# Patient Record
Sex: FEMALE | Race: BLACK OR AFRICAN AMERICAN | NOT HISPANIC OR LATINO | URBAN - METROPOLITAN AREA
[De-identification: names, ages, dates, MRNs, and addresses within clinical notes are randomized per-mention and may not be internally consistent; named-entity substitution may affect disease eponyms.]

---

## 2020-01-31 ENCOUNTER — TELEPHONE (OUTPATIENT)
Dept: CARDIOLOGY | Facility: CLINIC | Age: 75
End: 2020-01-31

## 2020-01-31 NOTE — TELEPHONE ENCOUNTER
"New Patient Visit Questionnaire  Use the F2 key to seamlessly move through the asterisks.  Reason for appointment? Needs cardiac clearance for Knee replacement with Dr Fortino Lawson    Who referred you: PCP    Any current symptoms (chest pain, shortness of breath, palpitations, swelling, etc.)?   NO  If patient has symptoms, how long have they been present?  N/A    Name of primary care physician  Edis Huang    Has the patient ever been seen by a cardiologist before?  NO               If YES, please obtain name and location of previous cardiologist.    Do you give us permission to obtain Medical records from the Providers listed? YES    What is the best way to send the New Patient Packet? Mail    In order to make the patient's visit as comprehensive as possible, please ask the following questions:    Have you had any recent lab work performed? Last labs June 2019              If yes, where was this performed?  PCP Office    Have you ever had any cardiac testing (echo, stress test, carotid or aortic ultrasounds, cardiac MRI, etc.) no  Which types of testing were performed?   When and where were they performed?  If the patient has copies of these reports, please bring them to the visit.     If the patient is coming for a visit regarding a heart murmur or heart valve problem, he/she will need to obtain a disc of their most recent echocardiogram and bring it to the appointment.     Have you ever had a cardiac catheterization, angioplasty, stent or heart surgery?  no  Which procedures?  When and where were these procedures performed?      Have you had any recent hospitalizations? no  If yes, where were you hospitalized?    For what reason?      PATIENT INSTRUCTIONS   Please bring a list of all your medications with the name of the medication, the milligrams and how frequently you take the medication.  For example: \"Amlodipine 10 mg once a day\".    If you do not have a list, please bring all of your medication " bottles with you.  Please include prescription and non-prescription medicines including supplements and vitamins.  Please arrive 20 to 30 minutes prior to your appointment time to complete any necessary paperwork.    Please bring your photo ID, insurance card and prescription card to the visit.

## 2020-02-03 ENCOUNTER — HOSPITAL ENCOUNTER (OUTPATIENT)
Dept: CARDIOLOGY | Facility: CLINIC | Age: 75
Setting detail: NUCLEAR MEDICINE
Discharge: HOME | End: 2020-02-03
Attending: INTERNAL MEDICINE
Payer: COMMERCIAL

## 2020-02-03 ENCOUNTER — OFFICE VISIT (OUTPATIENT)
Dept: CARDIOLOGY | Facility: CLINIC | Age: 75
End: 2020-02-03
Payer: COMMERCIAL

## 2020-02-03 VITALS
DIASTOLIC BLOOD PRESSURE: 80 MMHG | WEIGHT: 165 LBS | BODY MASS INDEX: 29.23 KG/M2 | HEIGHT: 63 IN | SYSTOLIC BLOOD PRESSURE: 140 MMHG

## 2020-02-03 VITALS
WEIGHT: 165.4 LBS | HEIGHT: 63 IN | BODY MASS INDEX: 29.3 KG/M2 | HEART RATE: 68 BPM | SYSTOLIC BLOOD PRESSURE: 140 MMHG | DIASTOLIC BLOOD PRESSURE: 80 MMHG | RESPIRATION RATE: 16 BRPM

## 2020-02-03 DIAGNOSIS — Z01.810 PREOPERATIVE CARDIOVASCULAR EXAMINATION: ICD-10-CM

## 2020-02-03 DIAGNOSIS — E78.00 ELEVATED CHOLESTEROL: ICD-10-CM

## 2020-02-03 DIAGNOSIS — I10 ESSENTIAL HYPERTENSION: ICD-10-CM

## 2020-02-03 DIAGNOSIS — R94.31 ABNORMAL EKG: Primary | ICD-10-CM

## 2020-02-03 DIAGNOSIS — R06.02 SHORTNESS OF BREATH: ICD-10-CM

## 2020-02-03 DIAGNOSIS — R94.31 ABNORMAL EKG: ICD-10-CM

## 2020-02-03 PROBLEM — R42 DIZZINESS: Status: ACTIVE | Noted: 2017-06-22

## 2020-02-03 PROBLEM — M19.90 DEGENERATIVE JOINT DISEASE: Status: ACTIVE | Noted: 2017-06-22

## 2020-02-03 PROCEDURE — 93306 TTE W/DOPPLER COMPLETE: CPT | Performed by: INTERNAL MEDICINE

## 2020-02-03 PROCEDURE — 99214 OFFICE O/P EST MOD 30 MIN: CPT | Mod: 25 | Performed by: INTERNAL MEDICINE

## 2020-02-03 PROCEDURE — 93000 ELECTROCARDIOGRAM COMPLETE: CPT | Performed by: INTERNAL MEDICINE

## 2020-02-03 RX ORDER — ASPIRIN 81 MG/1
81 TABLET ORAL DAILY
COMMUNITY
End: 2022-02-18

## 2020-02-03 RX ORDER — METOPROLOL SUCCINATE 25 MG/1
25 TABLET, EXTENDED RELEASE ORAL
COMMUNITY
Start: 2019-12-14 | End: 2020-03-12

## 2020-02-03 NOTE — LETTER
February 11, 2020     Edis Huang DO  [address]  [place] [address]    Patient: Bethanie Vela  YOB: 1945  Date of Visit: 2/3/2020      Dear Dr. Huang:    Thank you for referring Bethanie Vela to me for evaluation. Below are my notes for this consultation.    If you have questions, please do not hesitate to call me. I look forward to following your patient along with you.         Sincerely,        Blanca Lake MD        CC: MD Lorenzo Denton Jr., Erin, MD  2/11/2020  6:13 PM  Signed       Cardiology Office Visit     Patient ID: Bethanie Vela 74 y.o. female 1945  PCP: Edis Huang DO   History Present Illness     Bethanie Vela returns to the office today for the first time since June 2017 in order to obtain preoperative clearance.  We saw her at that time for a second opinion regarding the need for catheterization.  Bethanie is a 74-year-old -American female whose past medical history is significant for hypertension, which was previously treated with hydrochlorothiazide.  She became very dehydrated and orthostatic, necessitating a visit to the emergency room with dizziness and palpitations in 2017.  Her EKG was abnormal with some T wave inversion and nonspecific ST-T abnormality and she underwent treadmill stress testing.  At that time she was told that she had a poor exercise capacity, but she says she was scared walking on the treadmill and had knee discomfort.  The cardiologist at that time recommended a catheterization, but she did not feel comfortable with this recommendation.  Her cousin is a cardiologist at [place] and advised her to get a second opinion.    We performed a pharmacologic nuclear stress test on 7/11/2017, which was negative for ischemia.  She had mildly reduced isotope uptake at the apex consistent with breast attenuation.  Pharmacologic EKG was normal.  We decided at that point that it was not  "necessary for her to pursue cardiac catheterization and she was recommended to manage her issues with lifestyle changes and medications.    She now presents to the office today because she is undergoing knee surgery in the next few weeks and required preoperative clearance.  She saw her primary care physician, but was told that the EKG was abnormal and she needed to see cardiology.  Review of her EKG shows that there is no significant change from tracings obtained several years ago.    Past History     Past Medical History:   Diagnosis Date   • Abnormal EKG    • DJD (degenerative joint disease)    • Hypertension    • Lipid disorder    • Volume depletion     Secondary to use of hydrochlorothiazide.     Past Surgical History:   Procedure Laterality Date   • AXILLARY SURGERY      lump removed   • KNEE ARTHROSCOPY Left 2013     Social History:  reports that she has never smoked. She has never used smokeless tobacco. She reports that she drinks alcohol. She reports that she does not use drugs.    Family History: family history includes Diabetes in her biological father; Hyperlipidemia in her biological father; Lung cancer in her biological father; Thyroid disease in her biological mother.  Allergies/Medications   Allergies:Patient has no known allergies.    Medications:  Outpatient Encounter Medications as of 2/3/2020:   •  aspirin 81 mg enteric coated tablet, Take 81 mg by mouth daily.  •  metoprolol succinate XL (TOPROL-XL) 25 mg 24 hr tablet, Take 25 mg by mouth once daily.  ROS/Physical Exam   ROS  Pertinent items are noted in HPI.  Comprehensive (12+ point) ROS otherwise negative.  Vitals:    02/03/20 0918   BP: 140/80   BP Location: Left upper arm   Patient Position: Sitting   Pulse: 68   Resp: 16   Weight: 75 kg (165 lb 6.4 oz)   Height: 1.6 m (5' 3\")     BP Readings from Last 3 Encounters:   02/03/20 140/80     Wt Readings from Last 3 Encounters:   02/03/20 75 kg (165 lb 6.4 oz)     Physical Exam "   Constitutional: She is oriented to person, place, and time. She appears well-developed and well-nourished. She is cooperative.   Appears younger than stated age.   HENT:   Head: Normocephalic and atraumatic.   Mouth/Throat: Oropharynx is clear and moist and mucous membranes are normal.   Eyes: Conjunctivae and EOM are normal. No scleral icterus.   Neck: Normal range of motion. Neck supple. No hepatojugular reflux and no JVD present. Carotid bruit is not present. No thyromegaly present.   Cardiovascular: Normal rate, regular rhythm, S1 normal, S2 normal and intact distal pulses. PMI is not displaced. Exam reveals gallop and S4. Exam reveals no S3 and no friction rub.   Pulses:       Carotid pulses are 2+ on the right side, and 2+ on the left side.       Posterior tibial pulses are 2+ on the right side, and 2+ on the left side.   Pulmonary/Chest: Effort normal and breath sounds normal. She has no wheezes. She has no rhonchi. She has no rales.   Abdominal: Soft. Bowel sounds are normal. She exhibits no abdominal bruit. There is no tenderness.   Musculoskeletal: Normal range of motion. She exhibits no tenderness or deformity.   Neurological: She is alert and oriented to person, place, and time. No cranial nerve deficit.   Skin: Skin is warm, dry and intact. No rash noted. No cyanosis. Nails show no clubbing.   Psychiatric: Her speech is normal and behavior is normal. Her mood appears anxious.   Concerned because she was told that her EKG was abnormal and she needs to see us again before having knee surgery.     Labs/Imaging/Procedures   Labs  No recent lab work available.  Lab 6/19/2019: Total cholesterol 226, HDL 70, , glucose 116, creatinine 1.16, LFTs normal.  Hemoglobin A1c 6.3.  TSH 2.4    Imaging  Echo 2/3/2020: Overall normal LV size and function with LVEF 60-65%.  Mild concentric LVH.  Normal wall motion.  Impaired relaxation. Top normal RV size with preserved function.  Mild TR.  Estimated PASP 25  mmHg. Trileaflet aortic valve.  No aortic stenosis.  Visualized portions of aorta appear sclerotic.  Mildly thickened mitral valve leaflets with mild MR. No significant pericardial effusion.    Pharmacologic MPI 7/11/2017: Probably normal study.  Pharmacologic EKG negative for ischemia or arrhythmia.  Small area of mildly reduced isotope uptake at the apex which is predominantly fixed.  Most likely breast attenuation.  LVEF 84% with probable LVH.    Exercise stress echo at outside institution 5/16/2017: Borderline positive EKG portion.  Echocardiographic images negative for ischemia.  Below average exercise capacity.    EKG  Assessment/Plan     1. Preoperative cardiovascular examination  Bethanie may proceed with upcoming knee surgery and presents acceptable cardiovascular risk.  Although her EKG is abnormal, it is unchanged and she had prior stress testing performed which was negative for ischemia.  She has no new anginal symptoms.  An echocardiogram done in our office shows preserved LV function.  Please allow her to take her Toprol-XL the morning of surgery.  She may hold her aspirin for 5 to 7 days as needed.    2. Abnormal EKG  No significant EKG changes.    3. Elevated cholesterol  Bethanie has a history of some elevated glucose and could benefit from statin therapy.  She is not sure that she wants to pursue this right now and would prefer to try lifestyle changes.  Hopefully the knee surgery will allow her to be more physically active.    4. Essential hypertension  Continue Toprol-XL 25 mg daily.  Low-salt diet.  Regular physical activity.    Return if symptoms worsen or fail to improve.  She will contact us with any change in her symptoms.  I have reviewed the patient's medical history in detail and updated the computerized patient record.  Thank you for allowing me to participate in the care of this patient.  I hope this information is helpful.    Blanca Lake MD [facility]  2/3/2020  10:09 AM  This  document was generated utilizing voice recognition technology. Please excuse any typographical errors which may be present.

## 2020-02-05 LAB
AORTIC ROOT ANNULUS - M-MODE: 2.9 CM
ASCENDING AORTA: 3 CM
AV PEAK GRADIENT: 5 MMHG
AV PEAK VELOCITY-S: 1.11 M/S
AV VALVE AREA: 2.25 CM2
BSA FOR ECHO PROCEDURE: 1.82 M2
CUSP SEPARATION: 1.6 CM
DOP CALC LVOT STROKE VOLUME: 55.83 ML
E WAVE DECELERATION TIME: 197 MS
E/A RATIO: 0.8
E/E' RATIO: 13.9
E/LAT E' RATIO: 14.2
ECHO EF ESTIMATED: 56.8 %
EDV (BP): 32.5 CM3
EF (A4C): 62.8 %
EF A2C: 59.6 %
EJECTION FRACTION: 61.2 %
EST RIGHT VENT SYSTOLIC PRESSURE BY TRICUSPID REGURGITATION JET: 25 MMHG
ESV (BP): 12.6 CM3
FRACTIONAL SHORTENING: 29.1 %
INTERVENTRICULAR SEPTUM: 1.23 CM
LA ESV (BP): 40.3 CM3
LA ESV INDEX (A2C): 25.22 CM3/M2
LA ESV INDEX (BP): 22.14 CM3/M2
LA/AORTA RATIO: 1.28
LAAS-AP2: 17 CM2
LAAS-AP4: 15 CM2
LAD 2D - M-MODE: 3.7 CM
LAD 2D: 3.4 CM
LALD A4C: 5.09 CM
LALD A4C: 5.22 CM
LAV-S: 45.9 CM3
LEFT ATRIUM VOLUME INDEX: 19.07 CM3/M2
LEFT ATRIUM VOLUME: 34.7 CM3
LEFT INTERNAL DIMENSION IN SYSTOLE: 2.61 CM (ref 2.48–3.75)
LEFT VENTRICLE DIASTOLIC VOLUME INDEX: 19.95 CM3/M2
LEFT VENTRICLE DIASTOLIC VOLUME: 36.3 CM3
LEFT VENTRICLE SYSTOLIC VOLUME INDEX: 7.42 CM3/M2
LEFT VENTRICLE SYSTOLIC VOLUME: 13.5 CM3
LEFT VENTRICULAR INTERNAL DIMENSION IN DIASTOLE: 3.68 CM (ref 4.18–5.8)
LEFT VENTRICULAR POSTERIOR WALL IN END DIASTOLE: 1.15 CM (ref 0.55–1.02)
LV DIASTOLIC VOLUME: 29.2 CM3
LV ESV (APICAL 2 CHAMBER): 11.8 CM3
LVAD-AP2: 14.2 CM2
LVAD-AP4: 16 CM2
LVAS-AP2: 7.75 CM2
LVAS-AP4: 8.46 CM2
LVEDVI(A2C): 16.04 CM3/M2
LVEDVI(BP): 17.86 CM3/M2
LVESVI(A2C): 6.48 CM3/M2
LVESVI(BP): 6.92 CM3/M2
LVLD-AP2: 6.07 CM
LVLD-AP4: 6.1 CM
LVLS-AP2: 5.17 CM
LVLS-AP4: 4.99 CM
LVOT 2D: 1.9 CM
LVOT A: 2.84 CM2
LVOT MG: 2 MMHG
LVOT MV: 0.62 M/S
LVOT PEAK VELOCITY: 0.88 M/S
LVOT PG: 3 MMHG
LVOT VTI: 19.7 CM
MV E'TISSUE VEL-LAT: 0.06 M/S
MV E'TISSUE VEL-MED: 0.06 M/S
MV PEAK A VEL: 0.96 M/S
MV PEAK E VEL: 0.8 M/S
POSTERIOR WALL: 1.15 CM
PV PEAK GRADIENT: 2 MMHG
PV PV: 0.77 M/S
RVOT VMAX: 0.56 M/S
RVOT VTI: 14.3 CM
SEPTAL TISSUE DOPPLER FREE WALL LATE DIA VELOCITY (APICAL 4 CHAMBER VIEW): 0.09 M/S
TR MAX PG: 21 MMHG
TRICUSPID VALVE PEAK REGURGITATION VELOCITY: 2.31 M/S
Z-SCORE OF LEFT VENTRICULAR DIMENSION IN END DIASTOLE: -2.92
Z-SCORE OF LEFT VENTRICULAR DIMENSION IN END SYSTOLE: -1.22
Z-SCORE OF LEFT VENTRICULAR POSTERIOR WALL IN END DIASTOLE: 2.25

## 2020-02-07 NOTE — RESULT ENCOUNTER NOTE
Could you please let Mrs. Vela know that her echo looks good-it is very similar to the previous study.  Heart muscle function is normal.  No need for any additional testing for clearance.  Thank you

## 2020-02-21 ENCOUNTER — TELEPHONE (OUTPATIENT)
Dept: SCHEDULING | Facility: CLINIC | Age: 75
End: 2020-02-21

## 2020-02-21 NOTE — TELEPHONE ENCOUNTER
Bethanie called to request her offic enote 2/3 be sent to [facility]   Attn Barbara Crews  Fax#[phone]    Ov note and echo dated 2/3 were sent  Please route EKG tracing

## 2020-03-12 ENCOUNTER — TELEPHONE (OUTPATIENT)
Dept: SCHEDULING | Facility: CLINIC | Age: 75
End: 2020-03-12

## 2020-03-12 RX ORDER — METOPROLOL SUCCINATE 25 MG/1
50 TABLET, EXTENDED RELEASE ORAL
COMMUNITY
Start: 2020-03-12 | End: 2020-09-04

## 2020-03-12 RX ORDER — CHLORTHALIDONE 25 MG/1
25 TABLET ORAL DAILY
Qty: 90 TABLET | Refills: 1 | COMMUNITY
Start: 2020-03-12 | End: 2020-09-04 | Stop reason: SDUPTHER

## 2020-03-12 NOTE — TELEPHONE ENCOUNTER
TESSDE  Bethanie called to give the Dr an update regarding her BP,   after the procedure on 2/28 for her knee,  she went to the hospital because it went high.  She saw her PCP Dr Huang following this hospital visit. The Dr adjusted her meds.   She is taking Chlorithalidone 25 mg in addition to the metoprolol 50 mg. She has been taking this newer dose since Monday 3/2.     3/6-/86   /88  3/9-515pm 151/72  3/11 248pm-143/78  3/12-148pm 142/79

## 2020-03-12 NOTE — TELEPHONE ENCOUNTER
She should continue on these medications-it looks like her blood pressure is improving.  Please have her schedule an office visit with Kathryn if she is not seeing her primary doctor within the next 2 to 4 weeks.

## 2020-03-12 NOTE — TELEPHONE ENCOUNTER
Spoke with Bethanie instructed to stay on current medications adjusted by pcp verbalized full understanding. Also, Bethanie has follow up scheduled with PCP in 2 weeks, I instructed her to call us back with any additional concerns or issues verb. Full understanding. Thank you!

## 2020-09-04 ENCOUNTER — DOCUMENTATION (OUTPATIENT)
Dept: CARDIOLOGY | Facility: CLINIC | Age: 75
End: 2020-09-04

## 2020-09-04 ENCOUNTER — OFFICE VISIT (OUTPATIENT)
Dept: CARDIOLOGY | Facility: CLINIC | Age: 75
End: 2020-09-04
Payer: COMMERCIAL

## 2020-09-04 VITALS
WEIGHT: 170 LBS | OXYGEN SATURATION: 96 % | DIASTOLIC BLOOD PRESSURE: 88 MMHG | HEIGHT: 63 IN | HEART RATE: 78 BPM | SYSTOLIC BLOOD PRESSURE: 140 MMHG | TEMPERATURE: 97.9 F | BODY MASS INDEX: 30.12 KG/M2

## 2020-09-04 DIAGNOSIS — R06.02 SHORTNESS OF BREATH: ICD-10-CM

## 2020-09-04 DIAGNOSIS — I10 ESSENTIAL HYPERTENSION: ICD-10-CM

## 2020-09-04 DIAGNOSIS — E78.00 ELEVATED CHOLESTEROL: ICD-10-CM

## 2020-09-04 DIAGNOSIS — R94.31 ABNORMAL EKG: Primary | ICD-10-CM

## 2020-09-04 PROCEDURE — 99214 OFFICE O/P EST MOD 30 MIN: CPT | Performed by: INTERNAL MEDICINE

## 2020-09-04 RX ORDER — NEBIVOLOL 5 MG/1
5 TABLET ORAL DAILY
Qty: 28 TABLET | Refills: 0 | COMMUNITY
Start: 2020-09-04 | End: 2021-12-14 | Stop reason: SDUPTHER

## 2020-09-04 RX ORDER — CHLORTHALIDONE 25 MG/1
25 TABLET ORAL DAILY
Qty: 90 TABLET | Refills: 1 | Status: SHIPPED | OUTPATIENT
Start: 2020-09-04 | End: 2021-05-21

## 2020-09-04 NOTE — LETTER
"September 4, 2020     Edis Huang DO  [address]  [place] [address]    Patient: Bethanie Vela  YOB: 1945  Date of Visit: 9/4/2020      Dear Dr. Huang:    Thank you for referring Bethanie Vela to me for evaluation. Below are my notes for this consultation.    If you have questions, please do not hesitate to call me. I look forward to following your patient along with you.         Sincerely,        Blanca Lake MD        CC: No Recipients  Blanca Silva MD  9/4/2020  2:33 PM  Signed       Cardiology Office Visit     Patient ID: Bethanie Vela 74 y.o. female 1945  PCP: Edis Huang DO   History Present Illness     Bethanie Vela returns to the office today regarding an abnormal EKG and increasing shortness of breath.  Bethanie is a 74-year-old -American female whose past medical history is significant for hypertension and an abnormal EKG.  Please recall that several years ago she was told that she needed to have a cardiac catheterization after walking poorly on the treadmill.  The patient says that she was very scared walking with knee pain and thought that she could fall.  She had a pharmacologic stress test in July 2017 which was negative for ischemia.  She does have mild LVH on echocardiogram, consistent with her baseline EKG.    She was seen in February of this year in preparation of undergoing knee surgery and required preoperative clearance.  We accomplish this and she underwent surgery but says that her blood pressure was \"very high afterward\".  It did not really decrease over the following several days and she went to the emergency room, but was discharged.  Her Toprol-XL was increased from 25 to 50 mg daily and she remains on chlorthalidone.    Today she says that she has been feeling increasingly anxious and depressed for a multitude of reasons but her shortness of breath has worsened.  She feels short of breath when she goes " "upstairs, but usually not at rest.  She has no chest pain.  Chest x-ray was performed just a few days ago which we reviewed and it is unremarkable.  There is aortic ectasia but no infiltrates or pulmonary findings.  Her EKG is abnormal but unchanged with nonspecific ST-T abnormalities more prominent laterally.  We did not perform another tracing today.    Past History   History review: Pertinent allergies, medications, medical history, surgical history, social history and family history reviewed and updated appropriately.    Allergies/Medications   Allergies:Patient has no known allergies.    Medications:  Outpatient Encounter Medications as of 9/4/2020:   •  aspirin 81 mg enteric coated tablet, Take 81 mg by mouth daily.  •  chlorthalidone (HYGROTEN) 25 mg tablet, Take 1 tablet (25 mg total) by mouth daily.  •  [DISCONTINUED] metoprolol succinate XL (TOPROL-XL) 25 mg 24 hr tablet, Take 2 tablets (50 mg total) by mouth once daily.  •  nebivoloL (BYSTOLIC) 5 mg tablet, Take 1 tablet (5 mg total) by mouth daily.  ROS/Physical Exam   ROS  Pertinent items are noted in HPI.  Comprehensive (12+ point) ROS otherwise negative.  Vitals:    09/04/20 1143 09/04/20 1212   BP: 132/84 140/88   BP Location: Right upper arm Left upper arm   Patient Position: Sitting    Pulse: 78 78   Temp: 36.6 °C (97.9 °F)    SpO2: 96%    Weight: 77.1 kg (170 lb)    Height: 1.6 m (5' 3\")      BP Readings from Last 3 Encounters:   09/04/20 140/88   02/03/20 140/80   02/03/20 140/80     Wt Readings from Last 3 Encounters:   09/04/20 77.1 kg (170 lb)   02/03/20 74.8 kg (165 lb)   02/03/20 75 kg (165 lb 6.4 oz)     Physical Exam   Constitutional: She is oriented to person, place, and time. She appears well-developed and well-nourished. She is cooperative.   Appears younger than stated age.   HENT:   Head: Normocephalic and atraumatic.   Mouth/Throat: Oropharynx is clear and moist and mucous membranes are normal.   Eyes: Conjunctivae and EOM are " normal. No scleral icterus.   Neck: Normal range of motion. Neck supple. No hepatojugular reflux and no JVD present. Carotid bruit is not present. No thyromegaly present.   Cardiovascular: Normal rate, regular rhythm, S1 normal, S2 normal and intact distal pulses.  No extrasystoles are present. PMI is not displaced. Exam reveals gallop and S4. Exam reveals no S3 and no friction rub.   Murmur heard.  Pulses:       Carotid pulses are 2+ on the right side, and 2+ on the left side.       Posterior tibial pulses are 2+ on the right side, and 2+ on the left side.   There is a 1/6 systolic murmur at the right and left sternal borders   Pulmonary/Chest: Effort normal and breath sounds normal. She has no wheezes. She has no rhonchi. She has no rales.   Abdominal: Soft. Bowel sounds are normal. She exhibits no abdominal bruit. There is no tenderness.   Musculoskeletal: Normal range of motion. She exhibits no tenderness or deformity.   Neurological: She is alert and oriented to person, place, and time. No cranial nerve deficit.   Skin: Skin is warm, dry and intact. No rash noted. No cyanosis. Nails show no clubbing.   Psychiatric: Her speech is normal and behavior is normal. Her mood appears anxious. She exhibits a depressed mood.     Labs/Imaging/Procedures   Labs  Lab 6/19/2019: Total cholesterol 226, HDL 70, , glucose 116, creatinine 1.16, LFTs normal.  Hemoglobin A1c 6.3.  TSH 2.4     Imaging  Echo 2/3/2020: Overall normal LV size and function with LVEF 60-65%.  Mild concentric LVH. Normal wall motion.  Impaired relaxation. Top normal RV size with preserved function.  Mild TR. Estimated PASP 25 mmHg. Trileaflet aortic valve.  No aortic stenosis.  Visualized portions of aorta appear sclerotic. Mildly thickened mitral valve leaflets with mild MR. No significant pericardial effusion.     Pharm MPI 7/11/2017: Probably normal study.  Pharmacologic EKG negative for ischemia or arrhythmia.  Small area of mildly reduced  isotope uptake at the apex which is predominantly fixed.  Most likely breast attenuation.  LVEF 84% with probable LVH.     Exercise stress echo at outside institution 5/16/2017: Borderline positive EKG portion. Echocardiographic images negative for ischemia.  Below average exercise capacity.    EKG  Assessment/Plan     1. Abnormal EKG  Mrs. Vela has an abnormal EKG performed at outside offices, but it does not look any different than the one she had done in February 2020.  I think the EKG abnormalities are secondary to LVH and hypertensive heart disease.  Negative stress testing in 2017.    2. Elevated cholesterol  No recent lab work available although she states she did just have labs done at primary care.    3. Shortness of breath  Her shortness of breath is concerning.  It may be an anginal equivalent but I suspect it is related to suboptimal blood pressure control.  She has no evidence of volume overload.  Continue chlorthalidone 25 mg daily.  Change Toprol-XL to Bystolic 5 mg daily.  She was provided with samples of the medication.  I will talk to her in a week or so and see how she is feeling on the new medication.  If it is too expensive for her, I would change her to carvedilol.  We also discussed the fact that she will need repeat pharmacologic stress testing or possibly cardiac catheterization if her symptoms are persistent and unchanged with better blood pressure control.  She has gained some weight and is going to try to get back to a more prudent style of eating.    4. Essential hypertension  Suboptimal blood pressure control.  Change Toprol to Bystolic 5 mg daily and titrate appropriately.  Continue current dose of chlorthalidone.  Reinforced the importance of a low-salt diet.    Return in about 6 weeks (around 10/16/2020) for follow-up, earlier if any change in symptoms.  I have reviewed the patient's medical history in detail and updated the computerized patient record.  Thank you for allowing me  to participate in the care of this patient.  I hope this information is helpful.  Social distancing and careful monitoring for any COVID symptoms discussed with patient.      Blanca Lake MD [facility], [facility]  9/4/2020  2:26 PM  This document was generated utilizing voice recognition technology. Please excuse any typographical errors which may be present.

## 2020-09-04 NOTE — PROGRESS NOTES
"     Cardiology Office Visit     Patient ID: Bethanie Vela 74 y.o. female 1945  PCP: Edis Huang, DO   History Present Illness     Bethanie Vela returns to the office today regarding an abnormal EKG and increasing shortness of breath.  Bethanie is a 74-year-old -American female whose past medical history is significant for hypertension and an abnormal EKG.  Please recall that several years ago she was told that she needed to have a cardiac catheterization after walking poorly on the treadmill.  The patient says that she was very scared walking with knee pain and thought that she could fall.  She had a pharmacologic stress test in July 2017 which was negative for ischemia.  She does have mild LVH on echocardiogram, consistent with her baseline EKG.    She was seen in February of this year in preparation of undergoing knee surgery and required preoperative clearance.  We accomplish this and she underwent surgery but says that her blood pressure was \"very high afterward\".  It did not really decrease over the following several days and she went to the emergency room, but was discharged.  Her Toprol-XL was increased from 25 to 50 mg daily and she remains on chlorthalidone.    Today she says that she has been feeling increasingly anxious and depressed for a multitude of reasons but her shortness of breath has worsened.  She feels short of breath when she goes upstairs, but usually not at rest.  She has no chest pain.  Chest x-ray was performed just a few days ago which we reviewed and it is unremarkable.  There is aortic ectasia but no infiltrates or pulmonary findings.  Her EKG is abnormal but unchanged with nonspecific ST-T abnormalities more prominent laterally.  We did not perform another tracing today.    Past History   History review: Pertinent allergies, medications, medical history, surgical history, social history and family history reviewed and updated " "appropriately.    Allergies/Medications   Allergies:Patient has no known allergies.    Medications:  Outpatient Encounter Medications as of 9/4/2020:   •  aspirin 81 mg enteric coated tablet, Take 81 mg by mouth daily.  •  chlorthalidone (HYGROTEN) 25 mg tablet, Take 1 tablet (25 mg total) by mouth daily.  •  [DISCONTINUED] metoprolol succinate XL (TOPROL-XL) 25 mg 24 hr tablet, Take 2 tablets (50 mg total) by mouth once daily.  •  nebivoloL (BYSTOLIC) 5 mg tablet, Take 1 tablet (5 mg total) by mouth daily.  ROS/Physical Exam   ROS  Pertinent items are noted in HPI.  Comprehensive (12+ point) ROS otherwise negative.  Vitals:    09/04/20 1143 09/04/20 1212   BP: 132/84 140/88   BP Location: Right upper arm Left upper arm   Patient Position: Sitting    Pulse: 78 78   Temp: 36.6 °C (97.9 °F)    SpO2: 96%    Weight: 77.1 kg (170 lb)    Height: 1.6 m (5' 3\")      BP Readings from Last 3 Encounters:   09/04/20 140/88   02/03/20 140/80   02/03/20 140/80     Wt Readings from Last 3 Encounters:   09/04/20 77.1 kg (170 lb)   02/03/20 74.8 kg (165 lb)   02/03/20 75 kg (165 lb 6.4 oz)     Physical Exam   Constitutional: She is oriented to person, place, and time. She appears well-developed and well-nourished. She is cooperative.   Appears younger than stated age.   HENT:   Head: Normocephalic and atraumatic.   Mouth/Throat: Oropharynx is clear and moist and mucous membranes are normal.   Eyes: Conjunctivae and EOM are normal. No scleral icterus.   Neck: Normal range of motion. Neck supple. No hepatojugular reflux and no JVD present. Carotid bruit is not present. No thyromegaly present.   Cardiovascular: Normal rate, regular rhythm, S1 normal, S2 normal and intact distal pulses.  No extrasystoles are present. PMI is not displaced. Exam reveals gallop and S4. Exam reveals no S3 and no friction rub.   Murmur heard.  Pulses:       Carotid pulses are 2+ on the right side, and 2+ on the left side.       Posterior tibial pulses are 2+ " on the right side, and 2+ on the left side.   There is a 1/6 systolic murmur at the right and left sternal borders   Pulmonary/Chest: Effort normal and breath sounds normal. She has no wheezes. She has no rhonchi. She has no rales.   Abdominal: Soft. Bowel sounds are normal. She exhibits no abdominal bruit. There is no tenderness.   Musculoskeletal: Normal range of motion. She exhibits no tenderness or deformity.   Neurological: She is alert and oriented to person, place, and time. No cranial nerve deficit.   Skin: Skin is warm, dry and intact. No rash noted. No cyanosis. Nails show no clubbing.   Psychiatric: Her speech is normal and behavior is normal. Her mood appears anxious. She exhibits a depressed mood.     Labs/Imaging/Procedures   Labs  Lab 6/19/2019: Total cholesterol 226, HDL 70, , glucose 116, creatinine 1.16, LFTs normal.  Hemoglobin A1c 6.3.  TSH 2.4     Imaging  Echo 2/3/2020: Overall normal LV size and function with LVEF 60-65%.  Mild concentric LVH. Normal wall motion.  Impaired relaxation. Top normal RV size with preserved function.  Mild TR. Estimated PASP 25 mmHg. Trileaflet aortic valve.  No aortic stenosis.  Visualized portions of aorta appear sclerotic. Mildly thickened mitral valve leaflets with mild MR. No significant pericardial effusion.     Pharm MPI 7/11/2017: Probably normal study.  Pharmacologic EKG negative for ischemia or arrhythmia.  Small area of mildly reduced isotope uptake at the apex which is predominantly fixed.  Most likely breast attenuation.  LVEF 84% with probable LVH.     Exercise stress echo at outside institution 5/16/2017: Borderline positive EKG portion. Echocardiographic images negative for ischemia.  Below average exercise capacity.    EKG  Assessment/Plan     1. Abnormal EKG  Mrs. Vela has an abnormal EKG performed at outside offices, but it does not look any different than the one she had done in February 2020.  I think the EKG abnormalities are secondary  to LVH and hypertensive heart disease.  Negative stress testing in 2017.    2. Elevated cholesterol  No recent lab work available although she states she did just have labs done at primary care.    3. Shortness of breath  Her shortness of breath is concerning.  It may be an anginal equivalent but I suspect it is related to suboptimal blood pressure control.  She has no evidence of volume overload.  Continue chlorthalidone 25 mg daily.  Change Toprol-XL to Bystolic 5 mg daily.  She was provided with samples of the medication.  I will talk to her in a week or so and see how she is feeling on the new medication.  If it is too expensive for her, I would change her to carvedilol.  We also discussed the fact that she will need repeat pharmacologic stress testing or possibly cardiac catheterization if her symptoms are persistent and unchanged with better blood pressure control.  She has gained some weight and is going to try to get back to a more prudent style of eating.    4. Essential hypertension  Suboptimal blood pressure control.  Change Toprol to Bystolic 5 mg daily and titrate appropriately.  Continue current dose of chlorthalidone.  Reinforced the importance of a low-salt diet.    Return in about 6 weeks (around 10/16/2020) for follow-up, earlier if any change in symptoms.  I have reviewed the patient's medical history in detail and updated the computerized patient record.  Thank you for allowing me to participate in the care of this patient.  I hope this information is helpful.  Social distancing and careful monitoring for any COVID symptoms discussed with patient.      Blanca Lake MD [facility], [facility]  9/4/2020  2:26 PM  This document was generated utilizing voice recognition technology. Please excuse any typographical errors which may be present.

## 2020-10-09 ENCOUNTER — OFFICE VISIT (OUTPATIENT)
Dept: CARDIOLOGY | Facility: CLINIC | Age: 75
End: 2020-10-09
Payer: COMMERCIAL

## 2020-10-09 ENCOUNTER — DOCUMENTATION (OUTPATIENT)
Dept: CARDIOLOGY | Facility: CLINIC | Age: 75
End: 2020-10-09

## 2020-10-09 VITALS
BODY MASS INDEX: 29.23 KG/M2 | DIASTOLIC BLOOD PRESSURE: 78 MMHG | SYSTOLIC BLOOD PRESSURE: 120 MMHG | OXYGEN SATURATION: 99 % | HEIGHT: 63 IN | WEIGHT: 165 LBS | HEART RATE: 71 BPM

## 2020-10-09 DIAGNOSIS — E78.00 ELEVATED CHOLESTEROL: ICD-10-CM

## 2020-10-09 DIAGNOSIS — I10 ESSENTIAL HYPERTENSION: ICD-10-CM

## 2020-10-09 DIAGNOSIS — R94.31 ABNORMAL EKG: Primary | ICD-10-CM

## 2020-10-09 PROCEDURE — 99214 OFFICE O/P EST MOD 30 MIN: CPT | Performed by: INTERNAL MEDICINE

## 2020-10-09 NOTE — LETTER
October 12, 2020     Edis Huang DO  [address] [place] [id number]    Patient: Bethanie Vela  YOB: 1945  Date of Visit: 10/9/2020      Dear Dr. Huang:    Thank you for referring Bethanie Vela to me for evaluation. Below are my notes for this consultation.    If you have questions, please do not hesitate to call me. I look forward to following your patient along with you.         Sincerely,        Blanca Lake MD        CC: No Recipients  Blanca Silva MD  10/12/2020  1:28 PM  Signed       Cardiology Office Visit     Patient ID: Bethanie Vela 74 y.o. female 1945  PCP: Edis Huang DO   History Present Illness     Bethanie Vela returns to the office today for follow-up of her hypertension, elevated cholesterol and abnormal EKG.  At the time of her last visit, she felt quite unwell and had some increasing shortness of breath and felt generally more anxious.  She had no chest pain.  Her abnormal EKG was unchanged and I thought that the most likely etiology for her increasing dyspnea was elevated blood pressure as well as some weight gain.    We adjusted her medication, changing her Toprol-XL to Bystolic 5 mg daily.  I provided her with samples of this medication and she says she feels much better today in the office.  She is also been focusing on weight loss and is already down 5 pounds.  Mrs. Vela has no chest pain, PND or orthopnea.  She states that she had blood work done in your office, although I do not have these results available today.    Past History   History review: Pertinent allergies, medications, medical history, surgical history, social history and family history reviewed and updated appropriately.    Allergies/Medications   Allergies:Patient has no known allergies.    Medications:  Outpatient Encounter Medications as of 10/9/2020:   •  aspirin 81 mg enteric coated tablet, Take 81 mg by mouth daily.  •  chlorthalidone (HYGROTEN) 25  "mg tablet, Take 1 tablet (25 mg total) by mouth daily.  •  nebivoloL (BYSTOLIC) 5 mg tablet, Take 1 tablet (5 mg total) by mouth daily.  ROS/Physical Exam   ROS  Pertinent items are noted in HPI.  Comprehensive (12+ point) ROS otherwise negative.  Vitals:    10/09/20 1056   BP: 120/78   BP Location: Left upper arm   Patient Position: Sitting   Pulse: 71   SpO2: 99%   Weight: 74.8 kg (165 lb)   Height: 1.594 m (5' 2.75\")     BP Readings from Last 3 Encounters:   10/09/20 120/78   09/04/20 140/88   02/03/20 140/80     Wt Readings from Last 3 Encounters:   10/09/20 74.8 kg (165 lb)   09/04/20 77.1 kg (170 lb)   02/03/20 74.8 kg (165 lb)     Physical Exam   Constitutional: She is oriented to person, place, and time. She appears well-developed and well-nourished. She is cooperative.   Appears younger than stated age.   HENT:   Head: Normocephalic and atraumatic.   Mouth/Throat: Oropharynx is clear and moist and mucous membranes are normal.   Eyes: Conjunctivae and EOM are normal. No scleral icterus.   Neck: Normal range of motion. Neck supple. No hepatojugular reflux and no JVD present. Carotid bruit is not present. No thyromegaly present.   Cardiovascular: Normal rate, regular rhythm, S1 normal, S2 normal and intact distal pulses.  No extrasystoles are present. PMI is not displaced. Exam reveals gallop and S4. Exam reveals no S3 and no friction rub.   Murmur heard.  Pulses:       Carotid pulses are 2+ on the right side and 2+ on the left side.       Posterior tibial pulses are 2+ on the right side and 2+ on the left side.   There is a 1/6 systolic murmur at the right and left sternal borders   Pulmonary/Chest: Effort normal and breath sounds normal. She has no wheezes. She has no rhonchi. She has no rales.   Abdominal: Soft. Bowel sounds are normal. She exhibits no abdominal bruit. There is no abdominal tenderness.   Musculoskeletal: Normal range of motion.         General: No tenderness or deformity.   Neurological: " She is alert and oriented to person, place, and time. No cranial nerve deficit.   Skin: Skin is warm, dry and intact. No rash noted. No cyanosis. Nails show no clubbing.   Psychiatric: Her speech is normal and behavior is normal. Her mood appears anxious. She exhibits a depressed mood.     Labs/Imaging/Procedures   Labs  Lab 6/19/2019: Total cholesterol 226, HDL 70, , glucose 116, creatinine 1.16, LFTs normal.  Hemoglobin A1c 6.3.  TSH 2.4     Imaging  Echo 2/3/2020: Overall normal LV size and function with LVEF 60-65%.  Mild concentric LVH. Normal wall motion.  Impaired relaxation. Top normal RV size with preserved function.  Mild TR. Estimated PASP 25 mmHg. Trileaflet aortic valve.  No aortic stenosis.  Visualized portions of aorta appear sclerotic. Mildly thickened mitral valve leaflets with mild MR. No significant pericardial effusion.     Pharm MPI 7/11/2017: Probably normal study.  Pharmacologic EKG negative for ischemia or arrhythmia.  Small area of mildly reduced isotope uptake at the apex which is predominantly fixed.  Most likely breast attenuation.  LVEF 84% with probable LVH.     Exercise stress echo at outside institution 5/16/2017: Borderline positive EKG portion. Echocardiographic images negative for ischemia.  Below average exercise capacity.     EKG was not performed today  Assessment/Plan     1. Abnormal EKG  EKG is abnormal but unchanged.  She had a pharmacologic stress test in July 2017 which did not show any ischemia.  Her echocardiogram shows mild LVH with preserved LV function.  We will continue with her current medical therapy.    2. Essential hypertension  Patient feels much better after her blood pressure medications were adjusted.  Blood pressure is well controlled on chlorthalidone 25 mg daily and nebivolol 5 mg daily.  I congratulated her on her weight loss efforts and she will continue with this over the next few months.  Mrs. Vela seems very motivated with lifestyle  changes.    3. Elevated cholesterol  She showed me her lab work on the telephone and her cholesterol is elevated, including her LDL.  We discussed options for treating this, specifically statin therapy, which she declines to use and has declined on previous visits.  She states that she would like to continue with diet and exercise and I think this is reasonable to reassess in 6 months.  She is not diabetic and has not had any documented atherosclerosis or cardiac events.    Return in about 6 months (around 4/9/2021) for follow-up, earlier if any change in symptoms.  I have reviewed the patient's medical history in detail and updated the computerized patient record.  Thank you for allowing me to participate in the care of this patient.  I hope this information is helpful.  Social distancing and careful monitoring for any COVID symptoms discussed with patient.      Blanca Lake MD [facility], [facility]  10/12/2020  1:23 PM  This document was generated utilizing voice recognition technology. Please excuse any typographical errors which may be present.

## 2020-10-12 NOTE — PROGRESS NOTES
"     Cardiology Office Visit     Patient ID: Bethanie Vela 74 y.o. female 1945  PCP: Edis Huang, DO   History Present Illness     Bethanie Vela returns to the office today for follow-up of her hypertension, elevated cholesterol and abnormal EKG.  At the time of her last visit, she felt quite unwell and had some increasing shortness of breath and felt generally more anxious.  She had no chest pain.  Her abnormal EKG was unchanged and I thought that the most likely etiology for her increasing dyspnea was elevated blood pressure as well as some weight gain.    We adjusted her medication, changing her Toprol-XL to Bystolic 5 mg daily.  I provided her with samples of this medication and she says she feels much better today in the office.  She is also been focusing on weight loss and is already down 5 pounds.  Mrs. Vela has no chest pain, PND or orthopnea.  She states that she had blood work done in your office, although I do not have these results available today.    Past History   History review: Pertinent allergies, medications, medical history, surgical history, social history and family history reviewed and updated appropriately.    Allergies/Medications   Allergies:Patient has no known allergies.    Medications:  Outpatient Encounter Medications as of 10/9/2020:   •  aspirin 81 mg enteric coated tablet, Take 81 mg by mouth daily.  •  chlorthalidone (HYGROTEN) 25 mg tablet, Take 1 tablet (25 mg total) by mouth daily.  •  nebivoloL (BYSTOLIC) 5 mg tablet, Take 1 tablet (5 mg total) by mouth daily.  ROS/Physical Exam   ROS  Pertinent items are noted in HPI.  Comprehensive (12+ point) ROS otherwise negative.  Vitals:    10/09/20 1056   BP: 120/78   BP Location: Left upper arm   Patient Position: Sitting   Pulse: 71   SpO2: 99%   Weight: 74.8 kg (165 lb)   Height: 1.594 m (5' 2.75\")     BP Readings from Last 3 Encounters:   10/09/20 120/78   09/04/20 140/88   02/03/20 140/80     Wt Readings from " Last 3 Encounters:   10/09/20 74.8 kg (165 lb)   09/04/20 77.1 kg (170 lb)   02/03/20 74.8 kg (165 lb)     Physical Exam   Constitutional: She is oriented to person, place, and time. She appears well-developed and well-nourished. She is cooperative.   Appears younger than stated age.   HENT:   Head: Normocephalic and atraumatic.   Mouth/Throat: Oropharynx is clear and moist and mucous membranes are normal.   Eyes: Conjunctivae and EOM are normal. No scleral icterus.   Neck: Normal range of motion. Neck supple. No hepatojugular reflux and no JVD present. Carotid bruit is not present. No thyromegaly present.   Cardiovascular: Normal rate, regular rhythm, S1 normal, S2 normal and intact distal pulses.  No extrasystoles are present. PMI is not displaced. Exam reveals gallop and S4. Exam reveals no S3 and no friction rub.   Murmur heard.  Pulses:       Carotid pulses are 2+ on the right side and 2+ on the left side.       Posterior tibial pulses are 2+ on the right side and 2+ on the left side.   There is a 1/6 systolic murmur at the right and left sternal borders   Pulmonary/Chest: Effort normal and breath sounds normal. She has no wheezes. She has no rhonchi. She has no rales.   Abdominal: Soft. Bowel sounds are normal. She exhibits no abdominal bruit. There is no abdominal tenderness.   Musculoskeletal: Normal range of motion.         General: No tenderness or deformity.   Neurological: She is alert and oriented to person, place, and time. No cranial nerve deficit.   Skin: Skin is warm, dry and intact. No rash noted. No cyanosis. Nails show no clubbing.   Psychiatric: Her speech is normal and behavior is normal. Her mood appears anxious. She exhibits a depressed mood.     Labs/Imaging/Procedures   Labs  Lab 6/19/2019: Total cholesterol 226, HDL 70, , glucose 116, creatinine 1.16, LFTs normal.  Hemoglobin A1c 6.3.  TSH 2.4     Imaging  Echo 2/3/2020: Overall normal LV size and function with LVEF 60-65%.  Mild  concentric LVH. Normal wall motion.  Impaired relaxation. Top normal RV size with preserved function.  Mild TR. Estimated PASP 25 mmHg. Trileaflet aortic valve.  No aortic stenosis.  Visualized portions of aorta appear sclerotic. Mildly thickened mitral valve leaflets with mild MR. No significant pericardial effusion.     Pharm MPI 7/11/2017: Probably normal study.  Pharmacologic EKG negative for ischemia or arrhythmia.  Small area of mildly reduced isotope uptake at the apex which is predominantly fixed.  Most likely breast attenuation.  LVEF 84% with probable LVH.     Exercise stress echo at outside institution 5/16/2017: Borderline positive EKG portion. Echocardiographic images negative for ischemia.  Below average exercise capacity.     EKG was not performed today  Assessment/Plan     1. Abnormal EKG  EKG is abnormal but unchanged.  She had a pharmacologic stress test in July 2017 which did not show any ischemia.  Her echocardiogram shows mild LVH with preserved LV function.  We will continue with her current medical therapy.    2. Essential hypertension  Patient feels much better after her blood pressure medications were adjusted.  Blood pressure is well controlled on chlorthalidone 25 mg daily and nebivolol 5 mg daily.  I congratulated her on her weight loss efforts and she will continue with this over the next few months.  Mrs. Vela seems very motivated with lifestyle changes.    3. Elevated cholesterol  She showed me her lab work on the telephone and her cholesterol is elevated, including her LDL.  We discussed options for treating this, specifically statin therapy, which she declines to use and has declined on previous visits.  She states that she would like to continue with diet and exercise and I think this is reasonable to reassess in 6 months.  She is not diabetic and has not had any documented atherosclerosis or cardiac events.    Return in about 6 months (around 4/9/2021) for follow-up, earlier if  any change in symptoms.  I have reviewed the patient's medical history in detail and updated the computerized patient record.  Thank you for allowing me to participate in the care of this patient.  I hope this information is helpful.  Social distancing and careful monitoring for any COVID symptoms discussed with patient.      Blanca Lake MD [facility], [facility]  10/12/2020  1:23 PM  This document was generated utilizing voice recognition technology. Please excuse any typographical errors which may be present.

## 2020-11-16 ENCOUNTER — TELEPHONE (OUTPATIENT)
Dept: SCHEDULING | Facility: CLINIC | Age: 75
End: 2020-11-16

## 2020-11-16 NOTE — TELEPHONE ENCOUNTER
Bystolic 5 mg   [id number]  Exp: 03/22  4 bottles  Patient will come to our [facility] office to  Bystolic Samples.

## 2020-11-16 NOTE — TELEPHONE ENCOUNTER
Pt called to get call back for Samples of Bystolic 5mg .   Pt will  in  [facility] office if avail.   Please Call Pt   P#[phone]

## 2021-04-08 NOTE — RESULT ENCOUNTER NOTE
I will review the test results with the patient at upcoming appointment on 4/9/2021.  Need to address markedly elevated LDL..

## 2021-04-09 ENCOUNTER — OFFICE VISIT (OUTPATIENT)
Dept: CARDIOLOGY | Facility: CLINIC | Age: 76
End: 2021-04-09
Payer: COMMERCIAL

## 2021-04-09 VITALS
SYSTOLIC BLOOD PRESSURE: 130 MMHG | BODY MASS INDEX: 30.26 KG/M2 | WEIGHT: 170.8 LBS | OXYGEN SATURATION: 97 % | HEART RATE: 69 BPM | HEIGHT: 63 IN | DIASTOLIC BLOOD PRESSURE: 84 MMHG

## 2021-04-09 DIAGNOSIS — R94.31 ABNORMAL EKG: ICD-10-CM

## 2021-04-09 DIAGNOSIS — E78.00 ELEVATED CHOLESTEROL: ICD-10-CM

## 2021-04-09 DIAGNOSIS — R06.02 SHORTNESS OF BREATH: ICD-10-CM

## 2021-04-09 DIAGNOSIS — I10 ESSENTIAL HYPERTENSION: Primary | ICD-10-CM

## 2021-04-09 PROCEDURE — 93000 ELECTROCARDIOGRAM COMPLETE: CPT | Performed by: INTERNAL MEDICINE

## 2021-04-09 PROCEDURE — 99214 OFFICE O/P EST MOD 30 MIN: CPT | Performed by: INTERNAL MEDICINE

## 2021-04-09 PROCEDURE — 3008F BODY MASS INDEX DOCD: CPT | Performed by: INTERNAL MEDICINE

## 2021-04-09 RX ORDER — CICLOPIROX 8 %
KIT TOPICAL
COMMUNITY
End: 2021-08-13

## 2021-04-09 NOTE — LETTER
"April 9, 2021     Edis Huang DO  [address]  [place] [address]    Patient: Bethanie Vela  YOB: 1945  Date of Visit: 4/9/2021      Dear Dr. Huang:    Thank you for referring Bethanie Vela to me for evaluation. Below are my notes for this consultation.    If you have questions, please do not hesitate to call me. I look forward to following your patient along with you.         Sincerely,        Blanca Lake MD        CC: No Recipients  Blanca Silva MD  4/9/2021  2:03 PM  Signed       Cardiology Office Visit     Patient ID: Bethanie Vela 75 y.o. female 1945  PCP: Edis Huang DO   History Present Illness     Bethanie Vela returns to the office today for follow-up of her hypertension, abnormal EKG, elevated cholesterol and anxiety.  She says that she thinks her anxiety is worse than it was previously and she is also having difficulty sleeping.  She has developed a habit of \"scratching at her scalp\" and is also complaining of some hair loss.  She is not sure if this is due to medication or another issue.  She has an appointment to see a dermatologist.  She had several weeks where she was experiencing frequent epistaxis and was trying to get an appointment with ENT, but the office did not call back.  She says the epistaxis has stopped.  Bethanie has no chest pain.  She admits to dyspnea on exertion if she goes down the stairs to the basement and then comes up again quickly.  She has a 6 bedroom house and is able to maintain this on her own.    We reviewed her recent blood work and her cholesterol has increased significantly with an LDL going from 133 mg/dL to 197 mg/dL in about a year and a half!  In reviewing this with her and discussing her diet, she is eating a very high fat diet.  This was discussed with her at your office visit but she does not want to start medication right now and thinks that she can make significant lifestyle changes.  She " "also thinks that her anxiety is contributing to her eating poorly.    EKG is abnormal but unchanged.  Echocardiogram approximately 1 year ago showed preserved LV function with mild LVH.    Allergies/Medications   Allergies:Patient has no known allergies.    Medications:  Current Outpatient Medications   Medication Instructions   • aspirin 81 mg, oral, Daily   • chlorthalidone (HYGROTEN) 25 mg, oral, Daily   • ciclopirox-ure-camph-menth-euc 8 % solution topical (top)   • nebivoloL (BYSTOLIC) 5 mg, oral, Daily      Physical Exam     Vitals:    04/09/21 1010   BP: 130/84   BP Location: Left upper arm   Patient Position: Sitting   Pulse: 69   SpO2: 97%   Weight: 77.5 kg (170 lb 12.8 oz)   Height: 1.594 m (5' 2.75\")     BP Readings from Last 3 Encounters:   04/09/21 130/84   10/09/20 120/78   09/04/20 140/88     Wt Readings from Last 3 Encounters:   04/09/21 77.5 kg (170 lb 12.8 oz)   10/09/20 74.8 kg (165 lb)   09/04/20 77.1 kg (170 lb)      Physical Exam:  Constitutional: Well developed.  No apparent distress.  Looks younger than stated age.  Eyes: No icterus  ENT:  Mucosa moist  Neck: Supple, no JVD or hepatojugular reflux.  No bruits.  Cardiac: Normal S1 and S2, regular rate and rhythm, no S3.  No rub.  No ectopy.  S4 noted.  There is a 1/6 systolic murmur at the left sternal border.  Lungs: Clear to auscultation bilaterally.  No wheezing.  Abdomen: Soft, nontender, positive normoactive bowel sounds.  No bruit.  Extremities: No clubbing or cyanosis.  No calf tenderness.  No edema.  Distal pulses are intact.  Skin: Warm and dry.  No jaundice.  Musculoskeletal: No joint swelling or erythema.  Neurologic: Awake, alert, oriented.  Moving all extremities.  Psychiatric: No agitation.  Admits to feeling quite anxious.    Labs/Imaging/Procedures   Labs  3/23/2021: Total cholesterol 289, HDL 63, triglycerides 139 and LDL 97 mg/dL.  Glucose 103, BUN 22, creatinine 1.2, sodium 141, potassium 4.4, chloride 104, CO2 30, LFTs " unremarkable.  Uric acid 7.7.  TSH 4.39, free T4 1.  WBC 6.4, hemoglobin 13.6, platelets 216,000, total iron 93.    Imaging  Echo 2/3/2020: Overall normal LV size and function with LVEF 60-65%.  Mild concentric LVH. Normal wall motion.  Impaired relaxation. Top normal RV size with preserved function.  Mild TR. Estimated PASP 25 mmHg. Trileaflet aortic valve.  No aortic stenosis.  Visualized portions of aorta appear sclerotic. Mildly thickened mitral valve leaflets with mild MR. No significant pericardial effusion.     Pharm MPI 7/11/2017: Probably normal study.  Pharmacologic EKG negative for ischemia or arrhythmia.  Small area of mildly reduced isotope uptake at the apex which is predominantly fixed.  Most likely breast attenuation.  LVEF 84% with probable LVH.     Exercise stress echo at outside institution 5/16/2017: Borderline positive EKG portion. Echocardiographic images negative for ischemia.  Below average exercise capacity.       EKG  Assessment/Plan     1. Essential hypertension  Discussed sodium restriction, maintaining ideal body weight and regular exercise program with a goal of 150 minutes of moderate intensity aerobic exercise per week as physiologic means to help achieve blood pressure control.  The patient has been counseled about avoiding salty foods including bread and rolls, pizza, sandwiches, cold cuts, canned or powdered soups and prepared foods.  A Mediterranean style of eating incorporating vegetables, fruits, whole grains, plant-based proteins, lean animal proteins and fish is recommended.  Medical therapy discussed.  I am continuing Bystolic 5 mg daily and chlorthalidone 25 mg daily.    2. Abnormal EKG  EKG is abnormal but unchanged.  She does not have any new anginal symptoms.  Continue current therapy.    3. Elevated cholesterol  We discussed her lipid profile in detail and appropriate lifestyle changes.  She was given information regarding her Mediterranean diet.  I discussed getting  back to a regular exercise program and I suggested that she try walking 15 to 20 minutes at a time 3 to 4 days/week and gradually increase her exercise capacity again.  She was given a prescription for repeat lipid profile in 3 to 4 months and follow-up with me after that.    4. Shortness of breath  Shortness of breath with going up and down stairs.  She says that she felt short of breath when she was trying to get back to exercise, which is why I suggested that she start back to her walking slowly.  If she does not feel well and has difficulty doing this, she should let us know.  There are no acute EKG changes and her blood pressure is controlled despite her dietary indiscretion.  No evidence of congestive heart failure.    Return in about 4 months (around 8/9/2021) for follow-up, earlier if any change in symptoms.  Available medical records reviewed and updated including laboratory data, imaging studies, previous outpatient and inpatient records.  Counseling/education performed. Care everywhere utilized where appropriate.    Thank you for allowing me to participate in the care of this patient.  I hope this information is helpful.  Social distancing, masking, handwashing and careful monitoring for any COVID symptoms discussed with patient.      Blanca Lake MD [facility], [facility]  4/9/2021  1:58 PM  This document was generated utilizing voice recognition technology. Please excuse any typographical errors which may be present.

## 2021-04-09 NOTE — PATIENT INSTRUCTIONS
Patient Education     Mediterranean Diet  A Mediterranean diet refers to food and lifestyle choices that are based on the traditions of countries located on the [place]. This way of eating has been shown to help prevent certain conditions and improve outcomes for people who have chronic diseases, like kidney disease and heart disease.  What are tips for following this plan?  Lifestyle  · Cook and eat meals together with your family, when possible.  · Drink enough fluid to keep your urine clear or pale yellow.  · Be physically active every day. This includes:  ? Aerobic exercise like running or swimming.  ? Leisure activities like gardening, walking, or housework.  · Get 7-8 hours of sleep each night.  · If recommended by your health care provider, drink red wine in moderation. This means 1 glass a day for nonpregnant women and 2 glasses a day for men. A glass of wine equals 5 oz (150 mL).  Reading food labels    · Check the serving size of packaged foods. For foods such as rice and pasta, the serving size refers to the amount of cooked product, not dry.  · Check the total fat in packaged foods. Avoid foods that have saturated fat or trans fats.  · Check the ingredients list for added sugars, such as corn syrup.  Shopping  · At the grocery store, buy most of your food from the areas near the walls of the store. This includes:  ? Fresh fruits and vegetables (produce).  ? Grains, beans, nuts, and seeds. Some of these may be available in unpackaged forms or large amounts (in bulk).  ? Fresh seafood.  ? Poultry and eggs.  ? Low-fat dairy products.  · Buy whole ingredients instead of prepackaged foods.  · Buy fresh fruits and vegetables in-season from local farmers markets.  · Buy frozen fruits and vegetables in resealable bags.  · If you do not have access to quality fresh seafood, buy precooked frozen shrimp or canned fish, such as tuna, salmon, or sardines.  · Buy small amounts of raw or cooked vegetables,  salads, or olives from the deli or salad bar at your store.  · Stock your pantry so you always have certain foods on hand, such as olive oil, canned tuna, canned tomatoes, rice, pasta, and beans.  Cooking  · Cook foods with extra-virgin olive oil instead of using butter or other vegetable oils.  · Have meat as a side dish, and have vegetables or grains as your main dish. This means having meat in small portions or adding small amounts of meat to foods like pasta or stew.  · Use beans or vegetables instead of meat in common dishes like chili or lasagna.  · [place] with different cooking methods. Try roasting or broiling vegetables instead of steaming or sautéeing them.  · Add frozen vegetables to soups, stews, pasta, or rice.  · Add nuts or seeds for added healthy fat at each meal. You can add these to yogurt, salads, or vegetable dishes.  · Marinate fish or vegetables using olive oil, lemon juice, garlic, and fresh herbs.  Meal planning    · Plan to eat 1 vegetarian meal one day each week. Try to work up to 2 vegetarian meals, if possible.  · Eat seafood 2 or more times a week.  · Have healthy snacks readily available, such as:  ? Vegetable sticks with hummus.  ? Greek yogurt.  ? Fruit and nut trail mix.  · Eat balanced meals throughout the week. This includes:  ? Fruit: 2-3 servings a day  ? Vegetables: 4-5 servings a day  ? Low-fat dairy: 2 servings a day  ? Fish, poultry, or lean meat: 1 serving a day  ? Beans and legumes: 2 or more servings a week  ? Nuts and seeds: 1-2 servings a day  ? Whole grains: 6-8 servings a day  ? Extra-virgin olive oil: 3-4 servings a day  · Limit red meat and sweets to only a few servings a month  What are my food choices?  · Mediterranean diet  ? Recommended  § Grains: Whole-grain pasta. Brown rice. Bulgar wheat. Polenta. Couscous. Whole-wheat bread. Oatmeal. Quinoa.  § Vegetables: Artichokes. Beets. Broccoli. Cabbage. Carrots. Eggplant. Green beans. Chard. Kale. Spinach. Onions.  Leeks. Peas. Squash. Tomatoes. Peppers. Radishes.  § Fruits: Apples. Apricots. Avocado. Berries. Bananas. Cherries. Dates. Figs. Grapes. Nya. Melon. Oranges. Peaches. Plums. Pomegranate.  § Meats and other protein foods: Beans. Almonds. Sunflower seeds. Pine nuts. Peanuts. Cod. [place]. Scallops. Shrimp. Tuna. Tilapia. Clams. Oysters. Eggs.  § Dairy: Low-fat milk. Cheese. Greek yogurt.  § Beverages: Water. Red wine. Herbal tea.  § Fats and oils: Extra virgin olive oil. Avocado oil. Grape seed oil.  § Sweets and desserts: Greek yogurt with honey. Baked apples. Poached pears. Trail mix.  § Seasoning and other foods: Basil. Cilantro. Coriander. Cumin. Mint. Parsley. Santana. Rosemary. Tarragon. Garlic. Oregano. Thyme. Pepper. Balsalmic vinegar. Tahini. Hummus. Tomato sauce. Olives. Mushrooms.  ? Limit these  § Grains: Prepackaged pasta or rice dishes. Prepackaged cereal with added sugar.  § Vegetables: Deep fried potatoes (french fries).  § Fruits: Fruit canned in syrup.  § Meats and other protein foods: Beef. Pork. Lamb. Poultry with skin. Hot dogs. Little.  § Dairy: Ice cream. Sour cream. Whole milk.  § Beverages: Juice. Sugar-sweetened soft drinks. Beer. Liquor and spirits.  § Fats and oils: Butter. Canola oil. Vegetable oil. Beef fat (tallow). Lard.  § Sweets and desserts: Cookies. Cakes. Pies. Candy.  § Seasoning and other foods: Mayonnaise. Premade sauces and marinades.  The items listed may not be a complete list. Talk with your dietitian about what dietary choices are right for you.  Summary  · The Mediterranean diet includes both food and lifestyle choices.  · Eat a variety of fresh fruits and vegetables, beans, nuts, seeds, and whole grains.  · Limit the amount of red meat and sweets that you eat.  · Talk with your health care provider about whether it is safe for you to drink red wine in moderation. This means 1 glass a day for nonpregnant women and 2 glasses a day for men. A glass of wine equals 5 oz (150  mL).  This information is not intended to replace advice given to you by your health care provider. Make sure you discuss any questions you have with your health care provider.  Document Released: 08/10/2017 Document Revised: 08/17/2017 Document Reviewed: 08/10/2017  Elsevier Patient Education © 2020 [employer]       Patient Education     Mediterranean Diet  A Mediterranean diet refers to food and lifestyle choices that are based on the traditions of countries located on the [place]. This way of eating has been shown to help prevent certain conditions and improve outcomes for people who have chronic diseases, like kidney disease and heart disease.  What are tips for following this plan?  Lifestyle  · Cook and eat meals together with your family, when possible.  · Drink enough fluid to keep your urine clear or pale yellow.  · Be physically active every day. This includes:  ? Aerobic exercise like running or swimming.  ? Leisure activities like gardening, walking, or housework.  · Get 7-8 hours of sleep each night.  · If recommended by your health care provider, drink red wine in moderation. This means 1 glass a day for nonpregnant women and 2 glasses a day for men. A glass of wine equals 5 oz (150 mL).  Reading food labels    · Check the serving size of packaged foods. For foods such as rice and pasta, the serving size refers to the amount of cooked product, not dry.  · Check the total fat in packaged foods. Avoid foods that have saturated fat or trans fats.  · Check the ingredients list for added sugars, such as corn syrup.  Shopping  · At the grocery store, buy most of your food from the areas near the walls of the store. This includes:  ? Fresh fruits and vegetables (produce).  ? Grains, beans, nuts, and seeds. Some of these may be available in unpackaged forms or large amounts (in bulk).  ? Fresh seafood.  ? Poultry and eggs.  ? Low-fat dairy products.  · Buy whole ingredients instead of prepackaged  foods.  · Buy fresh fruits and vegetables in-season from local "[employer] markets.  · Buy frozen fruits and vegetables in resealable bags.  · If you do not have access to quality fresh seafood, buy precooked frozen shrimp or canned fish, such as tuna, salmon, or sardines.  · Buy small amounts of raw or cooked vegetables, salads, or olives from the deli or salad bar at your store.  · Stock your pantry so you always have certain foods on hand, such as olive oil, canned tuna, canned tomatoes, rice, pasta, and beans.  Cooking  · Cook foods with extra-virgin olive oil instead of using butter or other vegetable oils.  · Have meat as a side dish, and have vegetables or grains as your main dish. This means having meat in small portions or adding small amounts of meat to foods like pasta or stew.  · Use beans or vegetables instead of meat in common dishes like chili or lasagna.  · [place] with different cooking methods. Try roasting or broiling vegetables instead of steaming or sautéeing them.  · Add frozen vegetables to soups, stews, pasta, or rice.  · Add nuts or seeds for added healthy fat at each meal. You can add these to yogurt, salads, or vegetable dishes.  · Marinate fish or vegetables using olive oil, lemon juice, garlic, and fresh herbs.  Meal planning    · Plan to eat 1 vegetarian meal one day each week. Try to work up to 2 vegetarian meals, if possible.  · Eat seafood 2 or more times a week.  · Have healthy snacks readily available, such as:  ? Vegetable sticks with hummus.  ? Greek yogurt.  ? Fruit and nut trail mix.  · Eat balanced meals throughout the week. This includes:  ? Fruit: 2-3 servings a day  ? Vegetables: 4-5 servings a day  ? Low-fat dairy: 2 servings a day  ? Fish, poultry, or lean meat: 1 serving a day  ? Beans and legumes: 2 or more servings a week  ? Nuts and seeds: 1-2 servings a day  ? Whole grains: 6-8 servings a day  ? Extra-virgin olive oil: 3-4 servings a day  · Limit red meat and sweets  to only a few servings a month  What are my food choices?  · Mediterranean diet  ? Recommended  § Grains: Whole-grain pasta. Brown rice. Bulgar wheat. Polenta. Couscous. Whole-wheat bread. Oatmeal. Quinoa.  § Vegetables: Artichokes. Beets. Broccoli. Cabbage. Carrots. Eggplant. Green beans. Chard. Kale. Spinach. Onions. Leeks. Peas. Squash. Tomatoes. Peppers. Radishes.  § Fruits: Apples. Apricots. Avocado. Berries. Bananas. Cherries. Dates. Figs. Grapes. Nya. Melon. Oranges. Peaches. Plums. Pomegranate.  § Meats and other protein foods: Beans. Almonds. Sunflower seeds. Pine nuts. Peanuts. Cod. [place]. Scallops. Shrimp. Tuna. Tilapia. Clams. Oysters. Eggs.  § Dairy: Low-fat milk. Cheese. Greek yogurt.  § Beverages: Water. Red wine. Herbal tea.  § Fats and oils: Extra virgin olive oil. Avocado oil. Grape seed oil.  § Sweets and desserts: Greek yogurt with honey. Baked apples. Poached pears. Trail mix.  § Seasoning and other foods: Basil. Cilantro. Coriander. Cumin. Mint. Parsley. [place]ana. Rosemary. Tarragon. Garlic. Oregano. Thyme. Pepper. Balsalmic vinegar. Tahini. Hummus. Tomato sauce. Olives. Mushrooms.  ? Limit these  § Grains: Prepackaged pasta or rice dishes. Prepackaged cereal with added sugar.  § Vegetables: Deep fried potatoes (french fries).  § Fruits: Fruit canned in syrup.  § Meats and other protein foods: Beef. Pork. Lamb. Poultry with skin. Hot dogs. Little.  § Dairy: Ice cream. Sour cream. Whole milk.  § Beverages: Juice. Sugar-sweetened soft drinks. Beer. Liquor and spirits.  § Fats and oils: Butter. Canola oil. Vegetable oil. Beef fat (tallow). Lard.  § Sweets and desserts: Cookies. Cakes. Pies. Candy.  § Seasoning and other foods: Mayonnaise. Premade sauces and marinades.  The items listed may not be a complete list. Talk with your dietitian about what dietary choices are right for you.  Summary  · The Mediterranean diet includes both food and lifestyle choices.  · Eat a variety of fresh fruits and  vegetables, beans, nuts, seeds, and whole grains.  · Limit the amount of red meat and sweets that you eat.  · Talk with your health care provider about whether it is safe for you to drink red wine in moderation. This means 1 glass a day for nonpregnant women and 2 glasses a day for men. A glass of wine equals 5 oz (150 mL).  This information is not intended to replace advice given to you by your health care provider. Make sure you discuss any questions you have with your health care provider.  Document Released: 08/10/2017 Document Revised: 08/17/2017 Document Reviewed: 08/10/2017  [place] Patient Education © 2020 [employer]         ENT doctor - Dr Te Wood

## 2021-04-09 NOTE — PROGRESS NOTES
"     Cardiology Office Visit     Patient ID: Bethanie Vela 75 y.o. female 1945  PCP: Edis Huang, DO   History Present Illness     Bethanie Vela returns to the office today for follow-up of her hypertension, abnormal EKG, elevated cholesterol and anxiety.  She says that she thinks her anxiety is worse than it was previously and she is also having difficulty sleeping.  She has developed a habit of \"scratching at her scalp\" and is also complaining of some hair loss.  She is not sure if this is due to medication or another issue.  She has an appointment to see a dermatologist.  She had several weeks where she was experiencing frequent epistaxis and was trying to get an appointment with ENT, but the office did not call back.  She says the epistaxis has stopped.  Bethanie has no chest pain.  She admits to dyspnea on exertion if she goes down the stairs to the basement and then comes up again quickly.  She has a 6 bedroom house and is able to maintain this on her own.    We reviewed her recent blood work and her cholesterol has increased significantly with an LDL going from 133 mg/dL to 197 mg/dL in about a year and a half!  In reviewing this with her and discussing her diet, she is eating a very high fat diet.  This was discussed with her at your office visit but she does not want to start medication right now and thinks that she can make significant lifestyle changes.  She also thinks that her anxiety is contributing to her eating poorly.    EKG is abnormal but unchanged.  Echocardiogram approximately 1 year ago showed preserved LV function with mild LVH.    Allergies/Medications   Allergies:Patient has no known allergies.    Medications:  Current Outpatient Medications   Medication Instructions   • aspirin 81 mg, oral, Daily   • chlorthalidone (HYGROTEN) 25 mg, oral, Daily   • ciclopirox-ure-camph-menth-euc 8 % solution topical (top)   • nebivoloL (BYSTOLIC) 5 mg, oral, Daily      Physical Exam " "    Vitals:    04/09/21 1010   BP: 130/84   BP Location: Left upper arm   Patient Position: Sitting   Pulse: 69   SpO2: 97%   Weight: 77.5 kg (170 lb 12.8 oz)   Height: 1.594 m (5' 2.75\")     BP Readings from Last 3 Encounters:   04/09/21 130/84   10/09/20 120/78   09/04/20 140/88     Wt Readings from Last 3 Encounters:   04/09/21 77.5 kg (170 lb 12.8 oz)   10/09/20 74.8 kg (165 lb)   09/04/20 77.1 kg (170 lb)      Physical Exam:  Constitutional: Well developed.  No apparent distress.  Looks younger than stated age.  Eyes: No icterus  ENT:  Mucosa moist  Neck: Supple, no JVD or hepatojugular reflux.  No bruits.  Cardiac: Normal S1 and S2, regular rate and rhythm, no S3.  No rub.  No ectopy.  S4 noted.  There is a 1/6 systolic murmur at the left sternal border.  Lungs: Clear to auscultation bilaterally.  No wheezing.  Abdomen: Soft, nontender, positive normoactive bowel sounds.  No bruit.  Extremities: No clubbing or cyanosis.  No calf tenderness.  No edema.  Distal pulses are intact.  Skin: Warm and dry.  No jaundice.  Musculoskeletal: No joint swelling or erythema.  Neurologic: Awake, alert, oriented.  Moving all extremities.  Psychiatric: No agitation.  Admits to feeling quite anxious.    Labs/Imaging/Procedures   Labs  3/23/2021: Total cholesterol 289, HDL 63, triglycerides 139 and LDL 97 mg/dL.  Glucose 103, BUN 22, creatinine 1.2, sodium 141, potassium 4.4, chloride 104, CO2 30, LFTs unremarkable.  Uric acid 7.7.  TSH 4.39, free T4 1.  WBC 6.4, hemoglobin 13.6, platelets 216,000, total iron 93.    Imaging  Echo 2/3/2020: Overall normal LV size and function with LVEF 60-65%.  Mild concentric LVH. Normal wall motion.  Impaired relaxation. Top normal RV size with preserved function.  Mild TR. Estimated PASP 25 mmHg. Trileaflet aortic valve.  No aortic stenosis.  Visualized portions of aorta appear sclerotic. Mildly thickened mitral valve leaflets with mild MR. No significant pericardial effusion.     Pharm MPI " 7/11/2017: Probably normal study.  Pharmacologic EKG negative for ischemia or arrhythmia.  Small area of mildly reduced isotope uptake at the apex which is predominantly fixed.  Most likely breast attenuation.  LVEF 84% with probable LVH.     Exercise stress echo at outside institution 5/16/2017: Borderline positive EKG portion. Echocardiographic images negative for ischemia.  Below average exercise capacity.       EKG  Assessment/Plan     1. Essential hypertension  Discussed sodium restriction, maintaining ideal body weight and regular exercise program with a goal of 150 minutes of moderate intensity aerobic exercise per week as physiologic means to help achieve blood pressure control.  The patient has been counseled about avoiding salty foods including bread and rolls, pizza, sandwiches, cold cuts, canned or powdered soups and prepared foods.  A Mediterranean style of eating incorporating vegetables, fruits, whole grains, plant-based proteins, lean animal proteins and fish is recommended.  Medical therapy discussed.  I am continuing Bystolic 5 mg daily and chlorthalidone 25 mg daily.    2. Abnormal EKG  EKG is abnormal but unchanged.  She does not have any new anginal symptoms.  Continue current therapy.    3. Elevated cholesterol  We discussed her lipid profile in detail and appropriate lifestyle changes.  She was given information regarding her Mediterranean diet.  I discussed getting back to a regular exercise program and I suggested that she try walking 15 to 20 minutes at a time 3 to 4 days/week and gradually increase her exercise capacity again.  She was given a prescription for repeat lipid profile in 3 to 4 months and follow-up with me after that.    4. Shortness of breath  Shortness of breath with going up and down stairs.  She says that she felt short of breath when she was trying to get back to exercise, which is why I suggested that she start back to her walking slowly.  If she does not feel well and  has difficulty doing this, she should let us know.  There are no acute EKG changes and her blood pressure is controlled despite her dietary indiscretion.  No evidence of congestive heart failure.    Return in about 4 months (around 8/9/2021) for follow-up, earlier if any change in symptoms.  Available medical records reviewed and updated including laboratory data, imaging studies, previous outpatient and inpatient records.  Counseling/education performed. Care everywhere utilized where appropriate.    Thank you for allowing me to participate in the care of this patient.  I hope this information is helpful.  Social distancing, masking, handwashing and careful monitoring for any COVID symptoms discussed with patient.      Blanca Lake MD [facility], [facility]  4/9/2021  1:58 PM  This document was generated utilizing voice recognition technology. Please excuse any typographical errors which may be present.

## 2021-04-12 ENCOUNTER — APPOINTMENT (RX ONLY)
Dept: URBAN - METROPOLITAN AREA CLINIC 28 | Facility: CLINIC | Age: 76
Setting detail: DERMATOLOGY
End: 2021-04-12

## 2021-04-12 DIAGNOSIS — K13.0 DISEASES OF LIPS: ICD-10-CM

## 2021-04-12 DIAGNOSIS — L65.0 TELOGEN EFFLUVIUM: ICD-10-CM

## 2021-04-12 DIAGNOSIS — L21.8 OTHER SEBORRHEIC DERMATITIS: ICD-10-CM

## 2021-04-12 DIAGNOSIS — B35.1 TINEA UNGUIUM: ICD-10-CM

## 2021-04-12 PROCEDURE — ? COUNSELING

## 2021-04-12 PROCEDURE — ? PRESCRIPTION MEDICATION MANAGEMENT

## 2021-04-12 PROCEDURE — ? PRESCRIPTION

## 2021-04-12 PROCEDURE — 99203 OFFICE O/P NEW LOW 30 MIN: CPT

## 2021-04-12 RX ORDER — HYDROCORTISONE 25 MG/G
CREAM TOPICAL DAILY
Qty: 1 | Refills: 3 | Status: ERX | COMMUNITY
Start: 2021-04-12

## 2021-04-12 RX ORDER — CLOBETASOL PROPIONATE 0.5 MG/ML
SOLUTION TOPICAL DAILY
Qty: 1 | Refills: 3 | Status: ERX | COMMUNITY
Start: 2021-04-12

## 2021-04-12 RX ORDER — TRIAMCINOLONE ACETONIDE 1 MG/ML
LOTION TOPICAL BID
Qty: 1 | Refills: 3 | Status: ERX | COMMUNITY
Start: 2021-04-12

## 2021-04-12 RX ORDER — KETOCONAZOLE 20 MG/ML
SHAMPOO, SUSPENSION TOPICAL QDAY
Qty: 1 | Refills: 3 | Status: ERX | COMMUNITY
Start: 2021-04-12

## 2021-04-12 RX ADMIN — KETOCONAZOLE: 20 SHAMPOO, SUSPENSION TOPICAL at 00:00

## 2021-04-12 RX ADMIN — TRIAMCINOLONE ACETONIDE: 1 LOTION TOPICAL at 00:00

## 2021-04-12 RX ADMIN — HYDROCORTISONE: 25 CREAM TOPICAL at 00:00

## 2021-04-12 RX ADMIN — CLOBETASOL PROPIONATE: 0.5 SOLUTION TOPICAL at 00:00

## 2021-04-12 ASSESSMENT — LOCATION DETAILED DESCRIPTION DERM
LOCATION DETAILED: LEFT INFERIOR CENTRAL MALAR CHEEK
LOCATION DETAILED: MID-OCCIPITAL SCALP

## 2021-04-12 ASSESSMENT — LOCATION SIMPLE DESCRIPTION DERM
LOCATION SIMPLE: LEFT CHEEK
LOCATION SIMPLE: POSTERIOR SCALP

## 2021-04-12 ASSESSMENT — LOCATION ZONE DERM
LOCATION ZONE: SCALP
LOCATION ZONE: FACE

## 2021-04-12 NOTE — PROCEDURE: COUNSELING
Detail Level: Detailed
Detail Level: Zone
Patient Specific Counseling (Will Not Stick From Patient To Patient): Patient being treated by pcp

## 2021-04-12 NOTE — HPI: HAIR LOSS
Previous Labs: Yes
How Did The Hair Loss Occur?: gradual in onset
How Severe Is Your Hair Loss?: mild
When Were The Labs Drawn? (Drawn...): 2 weeks ago
Lab Details: High cholesterol
What Hair Products Do You Use?: Ketoconazole 2% shampoo from pcp
Additional History: Patient feels as if her hair loss is due to stress.

## 2021-04-12 NOTE — PROCEDURE: PRESCRIPTION MEDICATION MANAGEMENT
Initiate Treatment: ketoconazole 2 % shampoo Qday\\nclobetasol 0.05 % scalp solution Daily\\ntriamcinolone acetonide 0.1 % lotion Bid
Detail Level: Zone
Render In Strict Bullet Format?: No
Initiate Treatment: hydrocortisone 2.5 % topical cream Daily

## 2021-05-21 RX ORDER — CHLORTHALIDONE 25 MG/1
TABLET ORAL
Qty: 90 TABLET | Refills: 1 | Status: SHIPPED | OUTPATIENT
Start: 2021-05-21 | End: 2021-08-13

## 2021-05-25 ENCOUNTER — APPOINTMENT (RX ONLY)
Dept: URBAN - METROPOLITAN AREA CLINIC 28 | Facility: CLINIC | Age: 76
Setting detail: DERMATOLOGY
End: 2021-05-25

## 2021-05-25 DIAGNOSIS — L21.8 OTHER SEBORRHEIC DERMATITIS: ICD-10-CM | Status: IMPROVED

## 2021-05-25 PROCEDURE — ? COUNSELING

## 2021-05-25 PROCEDURE — ? PRESCRIPTION MEDICATION MANAGEMENT

## 2021-05-25 PROCEDURE — 99213 OFFICE O/P EST LOW 20 MIN: CPT

## 2021-05-25 ASSESSMENT — LOCATION ZONE DERM: LOCATION ZONE: SCALP

## 2021-05-25 ASSESSMENT — LOCATION SIMPLE DESCRIPTION DERM: LOCATION SIMPLE: ANTERIOR SCALP

## 2021-05-25 ASSESSMENT — LOCATION DETAILED DESCRIPTION DERM: LOCATION DETAILED: MID-FRONTAL SCALP

## 2021-05-25 NOTE — PROCEDURE: PRESCRIPTION MEDICATION MANAGEMENT
Render In Strict Bullet Format?: No
Continue Regimen: Ketoconazole 2% shampoo: apply to scalp let sit 3-5 minutes then rinse \\nClobetasol scalp solution: apply a few scattered drops into scalp BIW
Detail Level: Zone

## 2021-07-06 LAB
ALBUMIN SERPL-MCNC: 4.3 G/DL (ref 3.6–5.1)
ALBUMIN/GLOB SERPL: 1.4 (CALC) (ref 1–2.5)
ALP SERPL-CCNC: 50 U/L (ref 37–153)
ALT SERPL-CCNC: 15 U/L (ref 6–29)
AST SERPL-CCNC: 22 U/L (ref 10–35)
BILIRUB DIRECT SERPL-MCNC: 0.1 MG/DL
BILIRUB INDIRECT SERPL-MCNC: 0.3 MG/DL (CALC) (ref 0.2–1.2)
BILIRUB SERPL-MCNC: 0.4 MG/DL (ref 0.2–1.2)
CHOLEST SERPL-MCNC: 211 MG/DL
CHOLEST/HDLC SERPL: 3.1 (CALC)
GLOBULIN SER CALC-MCNC: 3.1 G/DL (CALC) (ref 1.9–3.7)
HDLC SERPL-MCNC: 67 MG/DL
LDLC SERPL CALC-MCNC: 123 MG/DL (CALC)
NONHDLC SERPL-MCNC: 144 MG/DL (CALC)
PROT SERPL-MCNC: 7.4 G/DL (ref 6.1–8.1)
TRIGL SERPL-MCNC: 100 MG/DL

## 2021-07-11 NOTE — RESULT ENCOUNTER NOTE
Ms. Vela:As promised, you worked hard and got your cholesterol back down!  It looks much better and is back down to the levels that you had a few years ago.  I think you could still benefit from a low-dose of cholesterol medicine, which would help improve your long-term risk, but you have made significant improvement since her last visit.  We can talk about it more at your follow-up appointment or feel free to contact me if you wish to discuss this earlier.  Great job!

## 2021-08-01 NOTE — PROGRESS NOTES
Cardiology Office Visit     Patient ID: Bethanie Vela 74 y.o. female 1945  PCP: Edis Huang, DO   History Present Illness     Bethanie Vela returns to the office today for the first time since June 2017 in order to obtain preoperative clearance.  We saw her at that time for a second opinion regarding the need for catheterization.  Bethanie is a 74-year-old -American female whose past medical history is significant for hypertension, which was previously treated with hydrochlorothiazide.  She became very dehydrated and orthostatic, necessitating a visit to the emergency room with dizziness and palpitations in 2017.  Her EKG was abnormal with some T wave inversion and nonspecific ST-T abnormality and she underwent treadmill stress testing.  At that time she was told that she had a poor exercise capacity, but she says she was scared walking on the treadmill and had knee discomfort.  The cardiologist at that time recommended a catheterization, but she did not feel comfortable with this recommendation.  Her cousin is a cardiologist at [place] and advised her to get a second opinion.    We performed a pharmacologic nuclear stress test on 7/11/2017, which was negative for ischemia.  She had mildly reduced isotope uptake at the apex consistent with breast attenuation.  Pharmacologic EKG was normal.  We decided at that point that it was not necessary for her to pursue cardiac catheterization and she was recommended to manage her issues with lifestyle changes and medications.    She now presents to the office today because she is undergoing knee surgery in the next few weeks and required preoperative clearance.  She saw her primary care physician, but was told that the EKG was abnormal and she needed to see cardiology.  Review of her EKG shows that there is no significant change from tracings obtained several years ago.    Past History     Past Medical History:   Diagnosis Date   • Abnormal  "EKG    • DJD (degenerative joint disease)    • Hypertension    • Lipid disorder    • Volume depletion     Secondary to use of hydrochlorothiazide.     Past Surgical History:   Procedure Laterality Date   • AXILLARY SURGERY      lump removed   • KNEE ARTHROSCOPY Left 2013     Social History:  reports that she has never smoked. She has never used smokeless tobacco. She reports that she drinks alcohol. She reports that she does not use drugs.    Family History: family history includes Diabetes in her biological father; Hyperlipidemia in her biological father; Lung cancer in her biological father; Thyroid disease in her biological mother.  Allergies/Medications   Allergies:Patient has no known allergies.    Medications:  Outpatient Encounter Medications as of 2/3/2020:   •  aspirin 81 mg enteric coated tablet, Take 81 mg by mouth daily.  •  metoprolol succinate XL (TOPROL-XL) 25 mg 24 hr tablet, Take 25 mg by mouth once daily.  ROS/Physical Exam   ROS  Pertinent items are noted in HPI.  Comprehensive (12+ point) ROS otherwise negative.  Vitals:    02/03/20 0918   BP: 140/80   BP Location: Left upper arm   Patient Position: Sitting   Pulse: 68   Resp: 16   Weight: 75 kg (165 lb 6.4 oz)   Height: 1.6 m (5' 3\")     BP Readings from Last 3 Encounters:   02/03/20 140/80     Wt Readings from Last 3 Encounters:   02/03/20 75 kg (165 lb 6.4 oz)     Physical Exam   Constitutional: She is oriented to person, place, and time. She appears well-developed and well-nourished. She is cooperative.   Appears younger than stated age.   HENT:   Head: Normocephalic and atraumatic.   Mouth/Throat: Oropharynx is clear and moist and mucous membranes are normal.   Eyes: Conjunctivae and EOM are normal. No scleral icterus.   Neck: Normal range of motion. Neck supple. No hepatojugular reflux and no JVD present. Carotid bruit is not present. No thyromegaly present.   Cardiovascular: Normal rate, regular rhythm, S1 normal, S2 normal and intact " distal pulses. PMI is not displaced. Exam reveals gallop and S4. Exam reveals no S3 and no friction rub.   Pulses:       Carotid pulses are 2+ on the right side, and 2+ on the left side.       Posterior tibial pulses are 2+ on the right side, and 2+ on the left side.   Pulmonary/Chest: Effort normal and breath sounds normal. She has no wheezes. She has no rhonchi. She has no rales.   Abdominal: Soft. Bowel sounds are normal. She exhibits no abdominal bruit. There is no tenderness.   Musculoskeletal: Normal range of motion. She exhibits no tenderness or deformity.   Neurological: She is alert and oriented to person, place, and time. No cranial nerve deficit.   Skin: Skin is warm, dry and intact. No rash noted. No cyanosis. Nails show no clubbing.   Psychiatric: Her speech is normal and behavior is normal. Her mood appears anxious.   Concerned because she was told that her EKG was abnormal and she needs to see us again before having knee surgery.     Labs/Imaging/Procedures   Labs  No recent lab work available.  Lab 6/19/2019: Total cholesterol 226, HDL 70, , glucose 116, creatinine 1.16, LFTs normal.  Hemoglobin A1c 6.3.  TSH 2.4    Imaging  Echo 2/3/2020: Overall normal LV size and function with LVEF 60-65%.  Mild concentric LVH.  Normal wall motion.  Impaired relaxation. Top normal RV size with preserved function.  Mild TR.  Estimated PASP 25 mmHg. Trileaflet aortic valve.  No aortic stenosis.  Visualized portions of aorta appear sclerotic.  Mildly thickened mitral valve leaflets with mild MR. No significant pericardial effusion.    Pharmacologic MPI 7/11/2017: Probably normal study.  Pharmacologic EKG negative for ischemia or arrhythmia.  Small area of mildly reduced isotope uptake at the apex which is predominantly fixed.  Most likely breast attenuation.  LVEF 84% with probable LVH.    Exercise stress echo at outside institution 5/16/2017: Borderline positive EKG portion.  Echocardiographic images negative  for ischemia.  Below average exercise capacity.    EKG  Assessment/Plan     1. Preoperative cardiovascular examination  Bethanie may proceed with upcoming knee surgery and presents acceptable cardiovascular risk.  Although her EKG is abnormal, it is unchanged and she had prior stress testing performed which was negative for ischemia.  She has no new anginal symptoms.  An echocardiogram done in our office shows preserved LV function.  Please allow her to take her Toprol-XL the morning of surgery.  She may hold her aspirin for 5 to 7 days as needed.    2. Abnormal EKG  No significant EKG changes.    3. Elevated cholesterol  Bethanie has a history of some elevated glucose and could benefit from statin therapy.  She is not sure that she wants to pursue this right now and would prefer to try lifestyle changes.  Hopefully the knee surgery will allow her to be more physically active.    4. Essential hypertension  Continue Toprol-XL 25 mg daily.  Low-salt diet.  Regular physical activity.    Return if symptoms worsen or fail to improve.  She will contact us with any change in her symptoms.  I have reviewed the patient's medical history in detail and updated the computerized patient record.  Thank you for allowing me to participate in the care of this patient.  I hope this information is helpful.    Blanca Lake MD [facility]  2/3/2020  10:09 AM  This document was generated utilizing voice recognition technology. Please excuse any typographical errors which may be present.   PCP for Immediate Care

## 2021-08-13 ENCOUNTER — OFFICE VISIT (OUTPATIENT)
Dept: CARDIOLOGY | Facility: CLINIC | Age: 76
End: 2021-08-13
Payer: COMMERCIAL

## 2021-08-13 VITALS
HEART RATE: 59 BPM | HEIGHT: 63 IN | BODY MASS INDEX: 26.22 KG/M2 | OXYGEN SATURATION: 96 % | DIASTOLIC BLOOD PRESSURE: 72 MMHG | WEIGHT: 148 LBS | SYSTOLIC BLOOD PRESSURE: 116 MMHG

## 2021-08-13 DIAGNOSIS — R06.02 SHORTNESS OF BREATH: ICD-10-CM

## 2021-08-13 DIAGNOSIS — E78.00 ELEVATED CHOLESTEROL: ICD-10-CM

## 2021-08-13 DIAGNOSIS — R94.31 ABNORMAL EKG: ICD-10-CM

## 2021-08-13 DIAGNOSIS — I10 ESSENTIAL HYPERTENSION: Primary | ICD-10-CM

## 2021-08-13 PROCEDURE — 93000 ELECTROCARDIOGRAM COMPLETE: CPT | Performed by: INTERNAL MEDICINE

## 2021-08-13 PROCEDURE — 3008F BODY MASS INDEX DOCD: CPT | Performed by: INTERNAL MEDICINE

## 2021-08-13 PROCEDURE — 99214 OFFICE O/P EST MOD 30 MIN: CPT | Performed by: INTERNAL MEDICINE

## 2021-08-13 RX ORDER — KETOCONAZOLE 20 MG/ML
1 SHAMPOO, SUSPENSION TOPICAL AS NEEDED
COMMUNITY
Start: 2021-05-09 | End: 2023-09-07

## 2021-08-13 RX ORDER — TRIAMCINOLONE ACETONIDE 1 MG/ML
1 LOTION TOPICAL AS NEEDED
COMMUNITY
Start: 2021-05-09 | End: 2023-09-07

## 2021-08-13 RX ORDER — CHLORTHALIDONE 25 MG/1
25 TABLET ORAL
Qty: 90 TABLET | Refills: 1
Start: 2021-08-13 | End: 2021-11-18

## 2021-08-13 RX ORDER — CICLOPIROX 80 MG/ML
1 SOLUTION TOPICAL AS NEEDED
COMMUNITY
Start: 2021-07-29 | End: 2023-09-07

## 2021-08-13 RX ORDER — ROSUVASTATIN CALCIUM 5 MG/1
5 TABLET, COATED ORAL DAILY
Qty: 90 TABLET | Refills: 1 | Status: SHIPPED | OUTPATIENT
Start: 2021-08-13 | End: 2021-12-08

## 2021-08-13 ASSESSMENT — PAIN SCALES - GENERAL: PAINLEVEL: 0-NO PAIN

## 2021-08-13 NOTE — LETTER
"August 13, 2021     Edis Huang DO  [address]  [place] [id number]    Patient: Bethanie Vela  YOB: 1945  Date of Visit: 8/13/2021      Dear Dr. Huang:    Thank you for referring Bethanie eVla to me for evaluation. Below are my notes for this consultation.    If you have questions, please do not hesitate to call me. I look forward to following your patient along with you.         Sincerely,        Blanca Lake MD        CC: No Recipients  Blanca Silva MD  8/13/2021  2:53 PM  Signed       Cardiology Office Visit     Patient ID: Bethanie Vela 75 y.o. female 1945  PCP: Edis Huang DO   History Present Illness     Bethanie Vela returns to the office today for follow-up of her hypertension, abnormal EKG, elevated cholesterol and anxiety.  She seems to be doing much better than at the time of her last visit, but she admits that she is under considerable stress due to her 's illness.  Her daughter,  and children, as well as her puppy, also recently moved in with her until her new house is ready.  She says this is keeping her very busy \"chasing after the puppy\" and she is \"constantly up and down the stairs\".  If you recall her weight was up to 170 pounds earlier this year with a subsequent increase in her cholesterol-she is now down 22 pounds and her total cholesterol has decreased quite a bit.  Her LDL is still not at goal and we reviewed her recent lab work today.      Bethanie says that she feels much better since losing weight.  She feels \"lighter\" and specifically notes that she is less dyspneic with going up and down stairs.  She denies PND or orthopnea.  She has no chest discomfort.      Her EKG is abnormal but unchanged from baseline.    Allergies/Medications   Allergies:Patient has no known allergies.    Medications:  Current Outpatient Medications   Medication Instructions   • aspirin 81 mg, oral, Daily   • chlorthalidone " "(HYGROTEN) 25 mg, oral, Daily (6a)   • ciclopirox (PENLAC) 8 % solution 1 application, Topical, As needed   • ketoconazole (NIZORAL) 2 % shampoo 1 application, Topical, As needed   • nebivoloL (BYSTOLIC) 5 mg, oral, Daily   • rosuvastatin (CRESTOR) 5 mg, oral, Daily   • triamcinolone (KENALOG) 0.1 % lotion 1 application, Topical, As needed      Physical Exam     Vitals:    08/13/21 1007   BP: 116/72   BP Location: Left upper arm   Patient Position: Sitting   Pulse: (!) 59   SpO2: 96%   Weight: 67.1 kg (148 lb)   Height: 1.594 m (5' 2.75\")     BP Readings from Last 3 Encounters:   08/13/21 116/72   04/09/21 130/84   10/09/20 120/78     Wt Readings from Last 3 Encounters:   08/13/21 67.1 kg (148 lb)   04/09/21 77.5 kg (170 lb 12.8 oz)   10/09/20 74.8 kg (165 lb)      Physical Exam:  Constitutional: Well developed.  No apparent distress.  Looks younger than stated age.  Eyes: No icterus  ENT:  Mucosa moist  Neck: Supple, no JVD or hepatojugular reflux.  No bruits.  Cardiac: Normal S1 and S2, regular rate and rhythm, no S3.  No rub.  No ectopy.  S4 noted.  There is a 1/6 systolic murmur at the left sternal border.  Lungs: Clear to auscultation bilaterally.  No wheezing.  Abdomen: Soft, nontender, positive normoactive bowel sounds.  No bruit.  Extremities: No clubbing or cyanosis.  No calf tenderness.  No edema.  Distal pulses are intact.  Skin: Warm and dry.  No jaundice.  Musculoskeletal: No joint swelling or erythema.  Neurologic: Awake, alert, oriented.  Moving all extremities.  Psychiatric: No agitation.      Labs/Imaging/Procedures   Labs  Lab Results   Component Value Date    ALT 15 07/06/2021    AST 22 07/06/2021    ALKPHOS 50 07/06/2021    BILITOT 0.4 07/06/2021     Lab Results   Component Value Date    CHOL 211 (H) 07/06/2021     Lab Results   Component Value Date    HDL 67 07/06/2021     Lab Results   Component Value Date    LDLCALC 123 (H) 07/06/2021     Lab Results   Component Value Date    TRIG 100 " 07/06/2021     3/23/2021: Total cholesterol 289, HDL 63, triglycerides 139 and  mg/dL.  Glucose 103, BUN 22, creatinine 1.2, sodium 141, potassium 4.4, chloride 104, CO2 30, LFTs unremarkable.  Uric acid 7.7.  TSH 4.39, free T4 1.  WBC 6.4, hemoglobin 13.6, platelets 216,000, total iron 93.    Imaging  Echo 2/3/2020: Overall normal LV size and function with LVEF 60-65%.  Mild concentric LVH. Normal wall motion.  Impaired relaxation. Top normal RV size with preserved function.  Mild TR. Estimated PASP 25 mmHg. Trileaflet aortic valve.  No aortic stenosis.  Visualized portions of aorta appear sclerotic. Mildly thickened mitral valve leaflets with mild MR. No significant pericardial effusion.     Pharm MPI 7/11/2017: Probably normal study.  Pharmacologic EKG negative for ischemia or arrhythmia.  Small area of mildly reduced isotope uptake at the apex which is predominantly fixed.  Most likely breast attenuation.  LVEF 84% with probable LVH.     Exercise stress echo at outside institution 5/16/2017: Borderline positive EKG portion. Echocardiographic images negative for ischemia.  Below average exercise capacity.       EKG  Assessment/Plan     1. Essential hypertension  Discussed sodium restriction, maintaining ideal body weight and regular exercise program with a goal of 150 minutes of moderate intensity aerobic exercise per week as physiologic means to help achieve blood pressure control.  The patient has been counseled about avoiding salty foods including bread and rolls, pizza, sandwiches, cold cuts, canned or powdered soups and prepared foods.  A Mediterranean style of eating incorporating vegetables, fruits, whole grains, plant-based proteins, lean animal proteins and fish is recommended.  Medical therapy discussed.  I am continuing Bystolic 5 mg daily and chlorthalidone 25 mg daily.    2. Abnormal EKG  EKG is abnormal but unchanged.  She does not have any new anginal symptoms.  Continue current  therapy.    3. Elevated cholesterol  We discussed her lipid profile in detail and appropriate lifestyle changes.  Patient has become more physically active and made significant dietary changes with about a 60 point decrease in her cholesterol, but her 10-year risk score is still elevated.  At this point she thinks she would like to start medication and I gave her a prescription for rosuvastatin 5 mg once daily.  She was provided with a prescription for lab work to be done in approximately 3 months.    4. Shortness of breath  Shortness of breath with going up and down stairs, now resolved after weight loss.     Return in about 6 months (around 2/13/2022) for follow-up, earlier if any change in symptoms.  Available medical records reviewed and updated including laboratory data, imaging studies, previous outpatient and inpatient records.  Counseling/education performed. Care everywhere utilized where appropriate.    Thank you for allowing me to participate in the care of this patient.  I hope this information is helpful.  Social distancing, masking, handwashing and careful monitoring for any COVID symptoms discussed with patient.      Blanca Lake MD [facility], [facility]  8/13/2021  2:48 PM  This document was generated utilizing voice recognition technology. Please excuse any typographical errors which may be present.

## 2021-08-13 NOTE — PROGRESS NOTES
"     Cardiology Office Visit     Patient ID: Bethanie Vela 75 y.o. female 1945  PCP: Edis Huang, DO   History Present Illness     Bethanie Vela returns to the office today for follow-up of her hypertension, abnormal EKG, elevated cholesterol and anxiety.  She seems to be doing much better than at the time of her last visit, but she admits that she is under considerable stress due to her 's illness.  Her daughter,  and children, as well as her puppy, also recently moved in with her until her new house is ready.  She says this is keeping her very busy \"chasing after the puppy\" and she is \"constantly up and down the stairs\".  If you recall her weight was up to 170 pounds earlier this year with a subsequent increase in her cholesterol-she is now down 22 pounds and her total cholesterol has decreased quite a bit.  Her LDL is still not at goal and we reviewed her recent lab work today.      Bethanie says that she feels much better since losing weight.  She feels \"lighter\" and specifically notes that she is less dyspneic with going up and down stairs.  She denies PND or orthopnea.  She has no chest discomfort.      Her EKG is abnormal but unchanged from baseline.    Allergies/Medications   Allergies:Patient has no known allergies.    Medications:  Current Outpatient Medications   Medication Instructions   • aspirin 81 mg, oral, Daily   • chlorthalidone (HYGROTEN) 25 mg, oral, Daily (6a)   • ciclopirox (PENLAC) 8 % solution 1 application, Topical, As needed   • ketoconazole (NIZORAL) 2 % shampoo 1 application, Topical, As needed   • nebivoloL (BYSTOLIC) 5 mg, oral, Daily   • rosuvastatin (CRESTOR) 5 mg, oral, Daily   • triamcinolone (KENALOG) 0.1 % lotion 1 application, Topical, As needed      Physical Exam     Vitals:    08/13/21 1007   BP: 116/72   BP Location: Left upper arm   Patient Position: Sitting   Pulse: (!) 59   SpO2: 96%   Weight: 67.1 kg (148 lb)   Height: 1.594 m (5' 2.75\") "     BP Readings from Last 3 Encounters:   08/13/21 116/72   04/09/21 130/84   10/09/20 120/78     Wt Readings from Last 3 Encounters:   08/13/21 67.1 kg (148 lb)   04/09/21 77.5 kg (170 lb 12.8 oz)   10/09/20 74.8 kg (165 lb)      Physical Exam:  Constitutional: Well developed.  No apparent distress.  Looks younger than stated age.  Eyes: No icterus  ENT:  Mucosa moist  Neck: Supple, no JVD or hepatojugular reflux.  No bruits.  Cardiac: Normal S1 and S2, regular rate and rhythm, no S3.  No rub.  No ectopy.  S4 noted.  There is a 1/6 systolic murmur at the left sternal border.  Lungs: Clear to auscultation bilaterally.  No wheezing.  Abdomen: Soft, nontender, positive normoactive bowel sounds.  No bruit.  Extremities: No clubbing or cyanosis.  No calf tenderness.  No edema.  Distal pulses are intact.  Skin: Warm and dry.  No jaundice.  Musculoskeletal: No joint swelling or erythema.  Neurologic: Awake, alert, oriented.  Moving all extremities.  Psychiatric: No agitation.      Labs/Imaging/Procedures   Labs  Lab Results   Component Value Date    ALT 15 07/06/2021    AST 22 07/06/2021    ALKPHOS 50 07/06/2021    BILITOT 0.4 07/06/2021     Lab Results   Component Value Date    CHOL 211 (H) 07/06/2021     Lab Results   Component Value Date    HDL 67 07/06/2021     Lab Results   Component Value Date    LDLCALC 123 (H) 07/06/2021     Lab Results   Component Value Date    TRIG 100 07/06/2021     3/23/2021: Total cholesterol 289, HDL 63, triglycerides 139 and  mg/dL.  Glucose 103, BUN 22, creatinine 1.2, sodium 141, potassium 4.4, chloride 104, CO2 30, LFTs unremarkable.  Uric acid 7.7.  TSH 4.39, free T4 1.  WBC 6.4, hemoglobin 13.6, platelets 216,000, total iron 93.    Imaging  Echo 2/3/2020: Overall normal LV size and function with LVEF 60-65%.  Mild concentric LVH. Normal wall motion.  Impaired relaxation. Top normal RV size with preserved function.  Mild TR. Estimated PASP 25 mmHg. Trileaflet aortic valve.  No  aortic stenosis.  Visualized portions of aorta appear sclerotic. Mildly thickened mitral valve leaflets with mild MR. No significant pericardial effusion.     Pharm MPI 7/11/2017: Probably normal study.  Pharmacologic EKG negative for ischemia or arrhythmia.  Small area of mildly reduced isotope uptake at the apex which is predominantly fixed.  Most likely breast attenuation.  LVEF 84% with probable LVH.     Exercise stress echo at outside institution 5/16/2017: Borderline positive EKG portion. Echocardiographic images negative for ischemia.  Below average exercise capacity.       EKG  Assessment/Plan     1. Essential hypertension  Discussed sodium restriction, maintaining ideal body weight and regular exercise program with a goal of 150 minutes of moderate intensity aerobic exercise per week as physiologic means to help achieve blood pressure control.  The patient has been counseled about avoiding salty foods including bread and rolls, pizza, sandwiches, cold cuts, canned or powdered soups and prepared foods.  A Mediterranean style of eating incorporating vegetables, fruits, whole grains, plant-based proteins, lean animal proteins and fish is recommended.  Medical therapy discussed.  I am continuing Bystolic 5 mg daily and chlorthalidone 25 mg daily.    2. Abnormal EKG  EKG is abnormal but unchanged.  She does not have any new anginal symptoms.  Continue current therapy.    3. Elevated cholesterol  We discussed her lipid profile in detail and appropriate lifestyle changes.  Patient has become more physically active and made significant dietary changes with about a 60 point decrease in her cholesterol, but her 10-year risk score is still elevated.  At this point she thinks she would like to start medication and I gave her a prescription for rosuvastatin 5 mg once daily.  She was provided with a prescription for lab work to be done in approximately 3 months.    4. Shortness of breath  Shortness of breath with going  up and down stairs, now resolved after weight loss.     Return in about 6 months (around 2/13/2022) for follow-up, earlier if any change in symptoms.  Available medical records reviewed and updated including laboratory data, imaging studies, previous outpatient and inpatient records.  Counseling/education performed. Care everywhere utilized where appropriate.    Thank you for allowing me to participate in the care of this patient.  I hope this information is helpful.  Social distancing, masking, handwashing and careful monitoring for any COVID symptoms discussed with patient.      Blanca Lake MD [facility], [facility]  8/13/2021  2:48 PM  This document was generated utilizing voice recognition technology. Please excuse any typographical errors which may be present.

## 2021-10-05 ENCOUNTER — APPOINTMENT (RX ONLY)
Dept: URBAN - METROPOLITAN AREA CLINIC 28 | Facility: CLINIC | Age: 76
Setting detail: DERMATOLOGY
End: 2021-10-05

## 2021-11-18 RX ORDER — CHLORTHALIDONE 25 MG/1
TABLET ORAL
Qty: 90 TABLET | Refills: 1 | Status: SHIPPED | OUTPATIENT
Start: 2021-11-18 | End: 2022-02-18

## 2021-12-08 ENCOUNTER — TELEPHONE (OUTPATIENT)
Dept: CARDIOLOGY | Facility: CLINIC | Age: 76
End: 2021-12-08
Payer: COMMERCIAL

## 2021-12-08 DIAGNOSIS — E78.00 ELEVATED CHOLESTEROL: ICD-10-CM

## 2021-12-08 RX ORDER — ROSUVASTATIN CALCIUM 5 MG/1
TABLET, COATED ORAL
Qty: 90 TABLET | Refills: 1 | Status: SHIPPED | OUTPATIENT
Start: 2021-12-08 | End: 2021-12-08 | Stop reason: SDUPTHER

## 2021-12-08 RX ORDER — ROSUVASTATIN CALCIUM 5 MG/1
5 TABLET, COATED ORAL
Qty: 90 TABLET | Refills: 3 | Status: SHIPPED | OUTPATIENT
Start: 2021-12-08 | End: 2022-09-06

## 2021-12-10 ENCOUNTER — TELEPHONE (OUTPATIENT)
Dept: CARDIOLOGY | Facility: CLINIC | Age: 76
End: 2021-12-10
Payer: COMMERCIAL

## 2021-12-10 RX ORDER — NEBIVOLOL 5 MG/1
5 TABLET ORAL DAILY
Qty: 90 TABLET | Refills: 1 | Status: CANCELLED | OUTPATIENT
Start: 2021-12-10

## 2021-12-10 NOTE — TELEPHONE ENCOUNTER
Pt of Dr. Ventura- Pt calling as she has 3 tabs of bystolic left.     States pharmacy told her insurance won't cover any longer and she will need to switch to metoprolol.     Please advise how you wish for pt to proceed. Will need new script sent to pharmacy.     Pt can be reached at [phone]. Uses pharmacy on file.

## 2021-12-10 NOTE — TELEPHONE ENCOUNTER
Spoke with pt, seems like refills have been sent in by pcp and not sure if Nebivolol is covered. She is requesting for Nebivolol to be sent into pharmacy and will go from there.

## 2021-12-10 NOTE — TELEPHONE ENCOUNTER
This drug is now generic so she can see if her insurance will cover nebivolol.  If they will not cover it, I would suggest carvedilol 6.25 mg twice daily.  Metroprolol did not adequately control her blood pressure.

## 2021-12-14 RX ORDER — NEBIVOLOL 5 MG/1
5 TABLET ORAL DAILY
Qty: 30 TABLET | Refills: 6 | Status: SHIPPED | OUTPATIENT
Start: 2021-12-14 | End: 2022-02-18 | Stop reason: SDUPTHER

## 2022-02-11 ENCOUNTER — TELEPHONE (OUTPATIENT)
Dept: CARDIOLOGY | Facility: CLINIC | Age: 77
End: 2022-02-11
Payer: COMMERCIAL

## 2022-02-11 NOTE — TELEPHONE ENCOUNTER
2/18/22     Spoke with patient- She will get her blood work done prior her 2/18/22 appt.    Labs emailed.    Rani

## 2022-02-14 LAB
ALBUMIN SERPL-MCNC: 4.1 G/DL (ref 3.6–5.1)
ALBUMIN/GLOB SERPL: 1.4 (CALC) (ref 1–2.5)
ALP SERPL-CCNC: 50 U/L (ref 37–153)
ALT SERPL-CCNC: 19 U/L (ref 6–29)
AST SERPL-CCNC: 24 U/L (ref 10–35)
BILIRUB DIRECT SERPL-MCNC: 0.1 MG/DL
BILIRUB INDIRECT SERPL-MCNC: 0.3 MG/DL (CALC) (ref 0.2–1.2)
BILIRUB SERPL-MCNC: 0.4 MG/DL (ref 0.2–1.2)
CHOLEST SERPL-MCNC: 197 MG/DL
CHOLEST/HDLC SERPL: 2.5 (CALC)
GLOBULIN SER CALC-MCNC: 3 G/DL (CALC) (ref 1.9–3.7)
HDLC SERPL-MCNC: 78 MG/DL
LDLC SERPL CALC-MCNC: 100 MG/DL (CALC)
NONHDLC SERPL-MCNC: 119 MG/DL (CALC)
PROT SERPL-MCNC: 7.1 G/DL (ref 6.1–8.1)
TRIGL SERPL-MCNC: 92 MG/DL

## 2022-02-18 ENCOUNTER — OFFICE VISIT (OUTPATIENT)
Dept: CARDIOLOGY | Facility: CLINIC | Age: 77
End: 2022-02-18
Payer: COMMERCIAL

## 2022-02-18 VITALS
BODY MASS INDEX: 28.35 KG/M2 | DIASTOLIC BLOOD PRESSURE: 68 MMHG | SYSTOLIC BLOOD PRESSURE: 116 MMHG | HEART RATE: 65 BPM | HEIGHT: 63 IN | OXYGEN SATURATION: 98 % | WEIGHT: 160 LBS

## 2022-02-18 DIAGNOSIS — I10 ESSENTIAL HYPERTENSION: ICD-10-CM

## 2022-02-18 DIAGNOSIS — R94.31 ABNORMAL EKG: Primary | ICD-10-CM

## 2022-02-18 DIAGNOSIS — R06.02 SHORTNESS OF BREATH: ICD-10-CM

## 2022-02-18 DIAGNOSIS — E78.00 ELEVATED CHOLESTEROL: ICD-10-CM

## 2022-02-18 PROCEDURE — 99214 OFFICE O/P EST MOD 30 MIN: CPT | Performed by: INTERNAL MEDICINE

## 2022-02-18 PROCEDURE — 93000 ELECTROCARDIOGRAM COMPLETE: CPT | Performed by: INTERNAL MEDICINE

## 2022-02-18 PROCEDURE — 3008F BODY MASS INDEX DOCD: CPT | Performed by: INTERNAL MEDICINE

## 2022-02-18 RX ORDER — CHLORTHALIDONE 25 MG/1
25 TABLET ORAL
Qty: 90 TABLET | Refills: 1
Start: 2022-02-18 | End: 2022-06-29

## 2022-02-18 RX ORDER — NEBIVOLOL 5 MG/1
5 TABLET ORAL DAILY
Qty: 90 TABLET | Refills: 3 | Status: SHIPPED | OUTPATIENT
Start: 2022-02-18 | End: 2022-07-06 | Stop reason: SDUPTHER

## 2022-02-18 NOTE — PROGRESS NOTES
"     Cardiology Office Visit     Patient ID: Bethanie Vela 76 y.o. female 1945  PCP: Edis Huang, DO   History Present Illness     Bethanie Vela returns to the office today for follow-up of her hypertension, abnormal EKG, elevated cholesterol and anxiety.  She feels her stress levels have improved but is disappointed that she is not doing a better job with diet.  She admits that she has been \"eating a lot of junk since Thanksgiving\" and ate \"almost a whole box of Girl  cookies\".  She was down to 148 pounds but is now up to 160 pounds.  Fortunately her blood pressure remains controlled.  We reviewed her last lipid profile and she has had an impressive drop in her LDL on rosuvastatin 5 mg daily with approximately 50% reduction-her initial LDL was 197 mg/dL.  Her EKG is unchanged.  She denies any exertional chest pain.  She says there are some rare occasions where she feels short of breath but nothing consistent.    Allergies/Medications   Allergies:Patient has no known allergies.    Medications:  Current Outpatient Medications   Medication Instructions   • chlorthalidone (HYGROTEN) 25 mg, oral, Daily (6a)   • ciclopirox (PENLAC) 8 % solution 1 application, Topical, As needed   • ketoconazole (NIZORAL) 2 % shampoo 1 application, Topical, As needed   • nebivoloL (BYSTOLIC) 5 mg, oral, Daily   • rosuvastatin (CRESTOR) 5 mg, oral, Daily (6a)   • triamcinolone (KENALOG) 0.1 % lotion 1 application, Topical, As needed      Physical Exam     Vitals:    02/18/22 1023   BP: 116/68   BP Location: Left upper arm   Patient Position: Sitting   Pulse: 65   SpO2: 98%   Weight: 72.6 kg (160 lb)   Height: 1.6 m (5' 3\")     BP Readings from Last 3 Encounters:   02/18/22 116/68   08/13/21 116/72   04/09/21 130/84     Wt Readings from Last 3 Encounters:   02/18/22 72.6 kg (160 lb)   08/13/21 67.1 kg (148 lb)   04/09/21 77.5 kg (170 lb 12.8 oz)      Physical Exam:  Constitutional: Well developed.  No apparent " distress.  Looks younger than stated age.  Eyes: No icterus  ENT:  Mucosa moist  Neck: Supple, no JVD or hepatojugular reflux.  No bruits.  Cardiac: Normal S1 and S2, regular rate and rhythm, no S3.  No rub.  No ectopy.  There is a soft systolic murmur at the left sternal border.  Lungs: Clear to auscultation bilaterally.  No wheezing.  Abdomen: Soft, nontender, positive normoactive bowel sounds.  No bruit.  Extremities: No clubbing or cyanosis.  No calf tenderness.  No edema.  Distal pulses are intact.  Skin: Warm and dry.  No jaundice.  Musculoskeletal: No joint swelling or erythema.  Neurologic: Awake, alert, oriented.  Moving all extremities.  Psychiatric: No agitation.    Labs/Imaging/Procedures   Labs  Lab Results   Component Value Date    ALT 19 02/14/2022    AST 24 02/14/2022     Lab Results   Component Value Date    CHOL 197 02/14/2022    CHOL 211 (H) 07/06/2021    HDL 78 02/14/2022    HDL 67 07/06/2021     Lab Results   Component Value Date    LDLCALC 100 (H) 02/14/2022    LDLCALC 123 (H) 07/06/2021     Imaging  Echo 2/3/2020: Overall normal LV size and function with LVEF 60-65%.  Mild concentric LVH. Normal wall motion.  Impaired relaxation. Top normal RV size with preserved function.  Mild TR. Estimated PASP 25 mmHg. Trileaflet aortic valve.  No aortic stenosis.  Visualized portions of aorta appear sclerotic. Mildly thickened mitral valve leaflets with mild MR. No significant pericardial effusion.     Pharm MPI 7/11/2017: Probably normal study.  Pharmacologic EKG negative for ischemia or arrhythmia.  Small area of mildly reduced isotope uptake at the apex which is predominantly fixed.  Most likely breast attenuation.  LVEF 84% with probable LVH.     Exercise stress echo at outside institution 5/16/2017: Borderline positive EKG portion. Echocardiographic images negative for ischemia.  Below average exercise capacity.    EKG  Assessment/Plan     1. Abnormal EKG  EKG is abnormal but unchanged.  We reviewed  these findings again today and she had a negative pharmacologic MPI in 2017.  There is definitely significant change in her exercise capacity her baseline EKG.  Asked her to have an echocardiogram at her next visit.  She understands that if she has any change in her exercise capacity, chest discomfort or increasing shortness of breath that she should contact me immediately.  - ECG 12 LEAD-OFFICE PERFORMED  - Transthoracic echo (TTE) complete; Future    2. Essential hypertension  Blood pressure is well controlled despite gaining back some weight.  Discussed sodium restriction, maintaining ideal body weight and regular exercise program with a goal of 150 minutes of moderate intensity aerobic exercise per week as physiologic means to help achieve blood pressure control.  The patient has been counseled about avoiding salty foods including bread and rolls, pizza, sandwiches, cold cuts, canned or powdered soups and prepared foods.  A Mediterranean style of eating incorporating vegetables, fruits, whole grains, plant-based proteins, lean animal proteins and fish is recommended.  Medical therapy discussed.  - chlorthalidone (HYGROTEN) 25 mg tablet; Take 1 tablet (25 mg total) by mouth once daily.  Dispense: 90 tablet; Refill: 1  - nebivoloL (BYSTOLIC) 5 mg tablet; Take 1 tablet (5 mg total) by mouth daily.  Dispense: 90 tablet; Refill: 3  - Transthoracic echo (TTE) complete; Future    3. Elevated cholesterol  Excellent response to low-dose rosuvastatin.   mg/dL.  Continue current therapy.    4. Shortness of breath  Occasional shortness of breath but no consistent pattern.  This generally improves when she reduces her weight.  No evidence of volume overload.  Blood pressure is controlled.  EKG unchanged.    Return in about 9 months (around 11/18/2022) for follow-up, earlier if any change in symptoms.  Available medical records reviewed and updated including laboratory data, imaging studies, previous outpatient and  inpatient records.  Counseling/education performed. Care everywhere utilized where appropriate.    Thank you for allowing me to participate in the care of this patient.  I hope this information is helpful.      Blanca Lake MD [facility], [facility]  2/18/2022  2:05 PM  This document was generated utilizing voice recognition technology. Please excuse any typographical errors which may be present.

## 2022-02-18 NOTE — LETTER
"February 18, 2022     Edis Huagn DO  [address]  [place] [address]    Patient: Bethanie Vela  YOB: 1945  Date of Visit: 2/18/2022      Dear Dr. Huang:    Thank you for referring Bethanie Vela to me for evaluation. Below are my notes for this consultation.    If you have questions, please do not hesitate to call me. I look forward to following your patient along with you.         Sincerely,        Blanca Lake MD        CC: No Recipients  Blanca Silva MD  2/18/2022  2:10 PM  Signed       Cardiology Office Visit     Patient ID: Bethanie Vela 76 y.o. female 1945  PCP: Edis Huang DO   History Present Illness     Bethanie Vela returns to the office today for follow-up of her hypertension, abnormal EKG, elevated cholesterol and anxiety.  She feels her stress levels have improved but is disappointed that she is not doing a better job with diet.  She admits that she has been \"eating a lot of junk since Thanksgiving\" and ate \"almost a whole box of Girl  cookies\".  She was down to 148 pounds but is now up to 160 pounds.  Fortunately her blood pressure remains controlled.  We reviewed her last lipid profile and she has had an impressive drop in her LDL on rosuvastatin 5 mg daily with approximately 50% reduction-her initial LDL was 197 mg/dL.  Her EKG is unchanged.  She denies any exertional chest pain.  She says there are some rare occasions where she feels short of breath but nothing consistent.    Allergies/Medications   Allergies:Patient has no known allergies.    Medications:  Current Outpatient Medications   Medication Instructions   • chlorthalidone (HYGROTEN) 25 mg, oral, Daily (6a)   • ciclopirox (PENLAC) 8 % solution 1 application, Topical, As needed   • ketoconazole (NIZORAL) 2 % shampoo 1 application, Topical, As needed   • nebivoloL (BYSTOLIC) 5 mg, oral, Daily   • rosuvastatin (CRESTOR) 5 mg, oral, Daily (6a)   • triamcinolone " "(KENALOG) 0.1 % lotion 1 application, Topical, As needed      Physical Exam     Vitals:    02/18/22 1023   BP: 116/68   BP Location: Left upper arm   Patient Position: Sitting   Pulse: 65   SpO2: 98%   Weight: 72.6 kg (160 lb)   Height: 1.6 m (5' 3\")     BP Readings from Last 3 Encounters:   02/18/22 116/68   08/13/21 116/72   04/09/21 130/84     Wt Readings from Last 3 Encounters:   02/18/22 72.6 kg (160 lb)   08/13/21 67.1 kg (148 lb)   04/09/21 77.5 kg (170 lb 12.8 oz)      Physical Exam:  Constitutional: Well developed.  No apparent distress.  Looks younger than stated age.  Eyes: No icterus  ENT:  Mucosa moist  Neck: Supple, no JVD or hepatojugular reflux.  No bruits.  Cardiac: Normal S1 and S2, regular rate and rhythm, no S3.  No rub.  No ectopy.  There is a soft systolic murmur at the left sternal border.  Lungs: Clear to auscultation bilaterally.  No wheezing.  Abdomen: Soft, nontender, positive normoactive bowel sounds.  No bruit.  Extremities: No clubbing or cyanosis.  No calf tenderness.  No edema.  Distal pulses are intact.  Skin: Warm and dry.  No jaundice.  Musculoskeletal: No joint swelling or erythema.  Neurologic: Awake, alert, oriented.  Moving all extremities.  Psychiatric: No agitation.    Labs/Imaging/Procedures   Labs  Lab Results   Component Value Date    ALT 19 02/14/2022    AST 24 02/14/2022     Lab Results   Component Value Date    CHOL 197 02/14/2022    CHOL 211 (H) 07/06/2021    HDL 78 02/14/2022    HDL 67 07/06/2021     Lab Results   Component Value Date    LDLCALC 100 (H) 02/14/2022    LDLCALC 123 (H) 07/06/2021     Imaging  Echo 2/3/2020: Overall normal LV size and function with LVEF 60-65%.  Mild concentric LVH. Normal wall motion.  Impaired relaxation. Top normal RV size with preserved function.  Mild TR. Estimated PASP 25 mmHg. Trileaflet aortic valve.  No aortic stenosis.  Visualized portions of aorta appear sclerotic. Mildly thickened mitral valve leaflets with mild MR. No " significant pericardial effusion.     Pharm MPI 7/11/2017: Probably normal study.  Pharmacologic EKG negative for ischemia or arrhythmia.  Small area of mildly reduced isotope uptake at the apex which is predominantly fixed.  Most likely breast attenuation.  LVEF 84% with probable LVH.     Exercise stress echo at outside institution 5/16/2017: Borderline positive EKG portion. Echocardiographic images negative for ischemia.  Below average exercise capacity.    EKG  Assessment/Plan     1. Abnormal EKG  EKG is abnormal but unchanged.  We reviewed these findings again today and she had a negative pharmacologic MPI in 2017.  There is definitely significant change in her exercise capacity her baseline EKG.  Asked her to have an echocardiogram at her next visit.  She understands that if she has any change in her exercise capacity, chest discomfort or increasing shortness of breath that she should contact me immediately.  - ECG 12 LEAD-OFFICE PERFORMED  - Transthoracic echo (TTE) complete; Future    2. Essential hypertension  Blood pressure is well controlled despite gaining back some weight.  Discussed sodium restriction, maintaining ideal body weight and regular exercise program with a goal of 150 minutes of moderate intensity aerobic exercise per week as physiologic means to help achieve blood pressure control.  The patient has been counseled about avoiding salty foods including bread and rolls, pizza, sandwiches, cold cuts, canned or powdered soups and prepared foods.  A Mediterranean style of eating incorporating vegetables, fruits, whole grains, plant-based proteins, lean animal proteins and fish is recommended.  Medical therapy discussed.  - chlorthalidone (HYGROTEN) 25 mg tablet; Take 1 tablet (25 mg total) by mouth once daily.  Dispense: 90 tablet; Refill: 1  - nebivoloL (BYSTOLIC) 5 mg tablet; Take 1 tablet (5 mg total) by mouth daily.  Dispense: 90 tablet; Refill: 3  - Transthoracic echo (TTE) complete;  Future    3. Elevated cholesterol  Excellent response to low-dose rosuvastatin.   mg/dL.  Continue current therapy.    4. Shortness of breath  Occasional shortness of breath but no consistent pattern.  This generally improves when she reduces her weight.  No evidence of volume overload.  Blood pressure is controlled.  EKG unchanged.    Return in about 9 months (around 11/18/2022) for follow-up, earlier if any change in symptoms.  Available medical records reviewed and updated including laboratory data, imaging studies, previous outpatient and inpatient records.  Counseling/education performed. Care everywhere utilized where appropriate.    Thank you for allowing me to participate in the care of this patient.  I hope this information is helpful.      Blanca Lake MD [facility], [facility]  2/18/2022  2:05 PM  This document was generated utilizing voice recognition technology. Please excuse any typographical errors which may be present.

## 2022-04-21 ENCOUNTER — TELEPHONE (OUTPATIENT)
Dept: CARDIOLOGY | Facility: CLINIC | Age: 77
End: 2022-04-21

## 2022-04-21 NOTE — TELEPHONE ENCOUNTER
Pt of Dr. Ventura-  Hx htn and elevated cholesterol.     Taking bystolic, crestor, chlorthalidone.     Pt calling today concerned about gradual hair loss and asking if possibly r/t medication.     States she occasionally uses scale treatments for dry patches on scale, these are prescribed by dermatologist. Derm says hair loss is not alopecia. Can not correlate hair loss with use of scalp treatments.     She does not notice clumps of hair falling out, but does reports a more gradual thinning appearance.     Pt asking for c/b at [phone] (home) or [phone] (cell)

## 2022-04-21 NOTE — TELEPHONE ENCOUNTER
I spoke with Bethanie she is going to speak with her dermatologist about hair loss and speak with her PCP about getting thyroid checked. Thank you!

## 2022-06-07 ENCOUNTER — APPOINTMENT (RX ONLY)
Dept: URBAN - METROPOLITAN AREA CLINIC 28 | Facility: CLINIC | Age: 77
Setting detail: DERMATOLOGY
End: 2022-06-07

## 2022-06-07 DIAGNOSIS — B07.8 OTHER VIRAL WARTS: ICD-10-CM

## 2022-06-07 DIAGNOSIS — L21.8 OTHER SEBORRHEIC DERMATITIS: ICD-10-CM

## 2022-06-07 PROCEDURE — 17110 DESTRUCTION B9 LES UP TO 14: CPT

## 2022-06-07 PROCEDURE — ? PRESCRIPTION MEDICATION MANAGEMENT

## 2022-06-07 PROCEDURE — 99213 OFFICE O/P EST LOW 20 MIN: CPT | Mod: 25

## 2022-06-07 PROCEDURE — ? BENIGN DESTRUCTION

## 2022-06-07 PROCEDURE — ? COUNSELING

## 2022-06-07 ASSESSMENT — LOCATION DETAILED DESCRIPTION DERM
LOCATION DETAILED: RIGHT INFERIOR ANTERIOR NECK
LOCATION DETAILED: MID-FRONTAL SCALP

## 2022-06-07 ASSESSMENT — LOCATION ZONE DERM
LOCATION ZONE: NECK
LOCATION ZONE: SCALP

## 2022-06-07 ASSESSMENT — LOCATION SIMPLE DESCRIPTION DERM
LOCATION SIMPLE: ANTERIOR SCALP
LOCATION SIMPLE: RIGHT ANTERIOR NECK

## 2022-06-07 NOTE — PROCEDURE: BENIGN DESTRUCTION
Anesthesia Volume In Cc: 0.5
Render Post-Care Instructions In Note?: no
Post-Care Instructions: I reviewed with the patient in detail post-care instructions. Patient is to wear sunprotection, and avoid picking at any of the treated lesions. Pt may apply Vaseline to crusted or scabbing areas.
Detail Level: Detailed
Medical Necessity Information: It is in your best interest to select a reason for this procedure from the list below. All of these items fulfill various CMS LCD requirements except the new and changing color options.
Treatment Number (Will Not Render If 0): 0
Consent: The patient's consent was obtained including but not limited to risks of crusting, scabbing, blistering, scarring, darker or lighter pigmentary change, recurrence, incomplete removal and infection.
Medical Necessity Clause: This procedure was medically necessary because the lesions that were treated were:

## 2022-06-07 NOTE — PROCEDURE: MIPS QUALITY
Quality 130: Documentation Of Current Medications In The Medical Record: Current Medications Documented
Quality 47: Advance Care Plan: Advance Care Planning discussed and documented in the medical record; patient did not wish or was not able to name a surrogate decision maker or provide an advance care plan.
Quality 431: Preventive Care And Screening: Unhealthy Alcohol Use - Screening: Patient not identified as an unhealthy alcohol user when screened for unhealthy alcohol use using a systematic screening method
Quality 111:Pneumonia Vaccination Status For Older Adults: Pneumococcal vaccine administered on or after patient’s 60th birthday and before the end of the measurement period
Detail Level: Detailed
Quality 226: Preventive Care And Screening: Tobacco Use: Screening And Cessation Intervention: Patient screened for tobacco use and is an ex/non-smoker

## 2022-06-29 DIAGNOSIS — I10 ESSENTIAL HYPERTENSION: ICD-10-CM

## 2022-06-29 RX ORDER — CHLORTHALIDONE 25 MG/1
TABLET ORAL
Qty: 90 TABLET | Refills: 1 | Status: SHIPPED | OUTPATIENT
Start: 2022-06-29 | End: 2022-10-10

## 2022-07-06 DIAGNOSIS — I10 ESSENTIAL HYPERTENSION: ICD-10-CM

## 2022-07-06 RX ORDER — NEBIVOLOL 5 MG/1
5 TABLET ORAL DAILY
Qty: 90 TABLET | Refills: 3 | Status: SHIPPED | OUTPATIENT
Start: 2022-07-06 | End: 2022-10-07 | Stop reason: SDUPTHER

## 2022-09-03 DIAGNOSIS — E78.00 ELEVATED CHOLESTEROL: ICD-10-CM

## 2022-09-06 RX ORDER — ROSUVASTATIN CALCIUM 5 MG/1
TABLET, COATED ORAL
Qty: 90 TABLET | Refills: 1 | Status: SHIPPED | OUTPATIENT
Start: 2022-09-06 | End: 2022-11-17

## 2022-10-07 DIAGNOSIS — I10 ESSENTIAL HYPERTENSION: ICD-10-CM

## 2022-10-07 RX ORDER — NEBIVOLOL 5 MG/1
5 TABLET ORAL DAILY
Qty: 90 TABLET | Refills: 3 | Status: SHIPPED | OUTPATIENT
Start: 2022-10-07 | End: 2022-12-08 | Stop reason: SDUPTHER

## 2022-10-10 DIAGNOSIS — I10 ESSENTIAL HYPERTENSION: ICD-10-CM

## 2022-10-10 RX ORDER — CHLORTHALIDONE 25 MG/1
TABLET ORAL
Qty: 90 TABLET | Refills: 1 | Status: SHIPPED | OUTPATIENT
Start: 2022-10-10 | End: 2022-11-17

## 2022-11-17 ENCOUNTER — OFFICE VISIT (OUTPATIENT)
Dept: CARDIOLOGY | Facility: CLINIC | Age: 77
End: 2022-11-17
Payer: COMMERCIAL

## 2022-11-17 ENCOUNTER — HOSPITAL ENCOUNTER (OUTPATIENT)
Dept: CARDIOLOGY | Facility: CLINIC | Age: 77
Discharge: HOME | End: 2022-11-17
Payer: COMMERCIAL

## 2022-11-17 VITALS
HEART RATE: 64 BPM | HEIGHT: 63 IN | SYSTOLIC BLOOD PRESSURE: 140 MMHG | RESPIRATION RATE: 18 BRPM | DIASTOLIC BLOOD PRESSURE: 72 MMHG | BODY MASS INDEX: 29.95 KG/M2 | WEIGHT: 169 LBS | OXYGEN SATURATION: 96 %

## 2022-11-17 VITALS
WEIGHT: 169 LBS | HEIGHT: 63 IN | BODY MASS INDEX: 29.95 KG/M2 | SYSTOLIC BLOOD PRESSURE: 140 MMHG | DIASTOLIC BLOOD PRESSURE: 75 MMHG

## 2022-11-17 DIAGNOSIS — E78.00 ELEVATED CHOLESTEROL: ICD-10-CM

## 2022-11-17 DIAGNOSIS — R94.31 ABNORMAL EKG: Primary | ICD-10-CM

## 2022-11-17 DIAGNOSIS — I10 ESSENTIAL HYPERTENSION: ICD-10-CM

## 2022-11-17 DIAGNOSIS — R94.31 ABNORMAL EKG: ICD-10-CM

## 2022-11-17 DIAGNOSIS — R06.02 SHORTNESS OF BREATH: ICD-10-CM

## 2022-11-17 LAB
AORTIC ROOT ANNULUS: 3 CM
ASCENDING AORTA: 3.1 CM
AV PEAK GRADIENT: 6 MMHG
AV PEAK VELOCITY-S: 1.22 M/S
AV VALVE AREA: 2.54 CM2
BSA FOR ECHO PROCEDURE: 1.85 M2
E WAVE DECELERATION TIME: 239 MS
E/A RATIO: 0.9
E/E' RATIO: 10.7
E/LAT E' RATIO: 14.4
EDV (BP): 40.7 CM3
EF (A4C): 65.2 %
EF A2C: 75.8 %
EJECTION FRACTION: 71 %
EST RIGHT VENT SYSTOLIC PRESSURE BY TRICUSPID REGURGITATION JET: 40 MMHG
ESV (BP): 11.8 CM3
FRACTIONAL SHORTENING: 34.2 %
INTERVENTRICULAR SEPTUM: 1.08 CM
LA ESV (BP): 36.6 CM3
LA ESV INDEX (A2C): 17.57 CM3/M2
LA ESV INDEX (BP): 19.78 CM3/M2
LA/AORTA RATIO: 1.03
LAAS-AP2: 14.4 CM2
LAAS-AP4: 16.5 CM2
LAD 2D: 3.1 CM
LALD A4C: 5.13 CM
LALD A4C: 5.29 CM
LAV-S: 32.5 CM3
LEFT ATRIUM VOLUME INDEX: 22.11 CM3/M2
LEFT ATRIUM VOLUME: 40.9 CM3
LEFT INTERNAL DIMENSION IN SYSTOLE: 2.46 CM (ref 2.5–3.78)
LEFT VENTRICLE DIASTOLIC VOLUME INDEX: 21.46 CM3/M2
LEFT VENTRICLE DIASTOLIC VOLUME: 39.7 CM3
LEFT VENTRICLE SYSTOLIC VOLUME INDEX: 7.46 CM3/M2
LEFT VENTRICLE SYSTOLIC VOLUME: 13.8 CM3
LEFT VENTRICULAR INTERNAL DIMENSION IN DIASTOLE: 4.58 CM (ref 4.23–5.87)
LEFT VENTRICULAR POSTERIOR WALL IN END DIASTOLE: 0.97 CM (ref 0.55–1.03)
LV DIASTOLIC VOLUME: 39.8 CM3
LV ESV (APICAL 2 CHAMBER): 9.66 CM3
LVAD-AP2: 16.5 CM2
LVAD-AP4: 17.4 CM2
LVAS-AP2: 7.3 CM2
LVAS-AP4: 8.64 CM2
LVEDVI(A2C): 21.51 CM3/M2
LVEDVI(BP): 22 CM3/M2
LVESVI(A2C): 5.22 CM3/M2
LVESVI(BP): 6.38 CM3/M2
LVLD-AP2: 5.85 CM
LVLD-AP4: 6.25 CM
LVLS-AP2: 4.64 CM
LVLS-AP4: 4.96 CM
LVOT 2D: 2 CM
LVOT A: 3.14 CM2
LVOT PEAK VELOCITY: 0.99 M/S
MV E'TISSUE VEL-LAT: 0.05 M/S
MV E'TISSUE VEL-MED: 0.07 M/S
MV PEAK A VEL: 0.82 M/S
MV PEAK E VEL: 0.72 M/S
POSTERIOR WALL: 1.02 CM
PV PEAK GRADIENT: 3 MMHG
PV PV: 0.81 M/S
RAP: 15 MMHG
RVOT VMAX: 0.79 M/S
RVOT VTI: 18.2 CM
SEPTAL TISSUE DOPPLER FREE WALL LATE DIA VELOCITY (APICAL 4 CHAMBER VIEW): 0.12 M/S
TR MAX PG: 25 MMHG
TRICUSPID VALVE PEAK REGURGITATION VELOCITY: 2.51 M/S
Z-SCORE OF LEFT VENTRICULAR DIMENSION IN END DIASTOLE: -0.83
Z-SCORE OF LEFT VENTRICULAR DIMENSION IN END SYSTOLE: -1.77
Z-SCORE OF LEFT VENTRICULAR POSTERIOR WALL IN END DIASTOLE: 1.31

## 2022-11-17 PROCEDURE — 93306 TTE W/DOPPLER COMPLETE: CPT | Performed by: INTERNAL MEDICINE

## 2022-11-17 PROCEDURE — 99214 OFFICE O/P EST MOD 30 MIN: CPT | Mod: 25 | Performed by: INTERNAL MEDICINE

## 2022-11-17 PROCEDURE — 93000 ELECTROCARDIOGRAM COMPLETE: CPT | Mod: XU | Performed by: INTERNAL MEDICINE

## 2022-11-17 PROCEDURE — 3008F BODY MASS INDEX DOCD: CPT | Performed by: INTERNAL MEDICINE

## 2022-11-17 RX ORDER — MELOXICAM 7.5 MG/1
7.5 TABLET ORAL DAILY
COMMUNITY
Start: 2022-06-27 | End: 2023-09-07

## 2022-11-17 RX ORDER — ASPIRIN 81 MG/1
81 TABLET ORAL DAILY
COMMUNITY

## 2022-11-17 RX ORDER — ROSUVASTATIN CALCIUM 5 MG/1
5 TABLET, COATED ORAL DAILY
Qty: 90 TABLET | Refills: 1 | COMMUNITY
Start: 2022-11-17 | End: 2023-02-27

## 2022-11-17 RX ORDER — CHLORTHALIDONE 25 MG/1
25 TABLET ORAL DAILY
Qty: 90 TABLET | Refills: 1 | COMMUNITY
Start: 2022-11-17 | End: 2023-03-31

## 2022-11-17 RX ORDER — GABAPENTIN 300 MG/1
300 CAPSULE ORAL 2 TIMES WEEKLY
COMMUNITY
Start: 2022-10-18 | End: 2023-09-07

## 2022-11-17 NOTE — LETTER
"November 17, 2022     Edis Huang DO  [address]  [place] [address]    Patient: Bethanie Vela  YOB: 1945  Date of Visit: 11/17/2022      Dear Dr. Huang:    Thank you for referring Bethanie Vela to me for evaluation. Below are my notes for this consultation.    If you have questions, please do not hesitate to call me. I look forward to following your patient along with you.         Sincerely,        Blanca Lake MD        CC: No Recipients  Blanca Silva MD  11/17/2022  6:23 PM  Signed       Cardiology Office Visit     Patient ID: Bethanie Vela 77 y.o. female 1945  PCP: Edis Huang DO   History Present Illness     Bethanie Vela returns to the office today for follow-up of her abnormal EKG, hypertension and elevated cholesterol.  She says that she has had several medical issues, but they are noncardiac.  She developed sciatica but is improved with physical therapy, meloxicam and gabapentin.  At this point she says she feels \"about 70% better\".  She gets some numbness in her toes and was told that this is likely neuropathy.  She says that her fingers feel tingly and somewhat numb if she reaches into the freezer section.  Bethanie denies any exertional chest discomfort, PND or orthopnea.    Her EKG is unchanged.  Immediately before the visit she had an echocardiogram which shows preserved LV function with mild concentric LVH.  Overall there is no significant change.  We reviewed these findings today.    Allergies/Medications   Allergies:Patient has no known allergies.    Medications:  Current Outpatient Medications   Medication Instructions    aspirin 81 mg, oral, Daily    chlorthalidone (HYGROTEN) 25 mg, oral, Daily    ciclopirox (PENLAC) 8 % solution 1 application, Topical, As needed    gabapentin (NEURONTIN) 300 mg, oral, 2 times weekly, prn    ketoconazole (NIZORAL) 2 % shampoo 1 application, Topical, As needed    meloxicam (MOBIC) 7.5 " "mg, oral, Daily    nebivoloL (BYSTOLIC) 5 mg, oral, Daily    rosuvastatin (CRESTOR) 5 mg, oral, Daily    triamcinolone (KENALOG) 0.1 % lotion 1 application, Topical, As needed      Physical Exam     Vitals:    11/17/22 1311   BP: 140/72   BP Location: Left upper arm   Patient Position: Sitting   Pulse: 64   Resp: 18   SpO2: 96%   Weight: 76.7 kg (169 lb)   Height: 1.6 m (5' 3\")     BP Readings from Last 3 Encounters:   11/17/22 140/72   11/17/22 140/75   02/18/22 116/68     Wt Readings from Last 3 Encounters:   11/17/22 76.7 kg (169 lb)   11/17/22 76.7 kg (169 lb)   02/18/22 72.6 kg (160 lb)      Physical Exam:  Constitutional: Well developed.  No apparent distress.  Looks younger than stated age.  Eyes: No icterus  ENT:  Mucosa moist  Neck: Supple, no JVD or hepatojugular reflux.  No bruits.  Cardiac: Normal S1 and S2, regular rate and rhythm, no S3.  No rub.  No ectopy.  There is a soft systolic murmur at the left sternal border.  Lungs: Clear to auscultation bilaterally.  No wheezing.  Abdomen: Soft, nontender, positive normoactive bowel sounds.  No bruit.  Extremities: No clubbing or cyanosis.  No calf tenderness.  No edema.  Distal pulses are intact.  Skin: Warm and dry.  No jaundice.  Musculoskeletal: No joint swelling or erythema.  Neurologic: Awake, alert, oriented.  Moving all extremities.  Psychiatric: No agitation.  Anxious about her health.    Labs/Imaging/Procedures   Labs  11/4/2022: Glucose 79, BUN 25, creatinine 1.23, GFR 45, sodium 140, potassium 4.1, chloride 103, CO2 27, LFTs unremarkable.  Total cholesterol 186, HDL 70, triglycerides 99, LDL 97 mg/dL.  WBC 6.7, hemoglobin 12.8, platelets 235,000, free T4 1, TSH 1.88    Imaging  Echo 11/17/2022:  Normal LV size and function with LVEF 70-75%.  Mild concentric LVH.  No LVOT gradient.  Abnormal septal motion, otherwise wall motion appears grossly normal.  Impaired relaxation. Normal RV size and function.  Mild TR.  Estimated RVSP 40 mmHg. The " aortic valve is trileaflet and sclerotic.  No aortic stenosis.· Visualized portions aorta appear sclerotic but normal in caliber. The mitral valve leaflets are mildly thickened.  Trace to at most mild mitral regurgitation. Grossly normal pulmonic valve with trace regurgitation. The IVC is dilated with diminished respiratory collapse. Prominent pericardial fat pad, but no significant effusion. Results discussed with patient immediately following echocardiogram.  Overall no significant change.  IVC was not well visualized on the previous study.    Echo 2/3/2020: Overall normal LV size and function with LVEF 60-65%.  Mild concentric LVH. Normal wall motion.  Impaired relaxation. Top normal RV size with preserved function.  Mild TR. Estimated PASP 25 mmHg. Trileaflet aortic valve.  No aortic stenosis.  Visualized portions of aorta appear sclerotic. Mildly thickened mitral valve leaflets with mild MR. No significant pericardial effusion.     Pharm MPI 7/11/2017: Probably normal study.  Pharmacologic EKG negative for ischemia or arrhythmia.  Small area of mildly reduced isotope uptake at the apex which is predominantly fixed.  Most likely breast attenuation.  LVEF 84% with probable LVH.     Exercise stress echo at outside institution 5/16/2017: Borderline positive EKG portion. Echocardiographic images negative for ischemia.  Below average exercise capacity.    EKG  Assessment/Plan     1. Abnormal EKG    2. Essential hypertension    3. Elevated cholesterol    4. Shortness of breath       Bethanie is doing well and we reviewed her echocardiogram results.  Her blood pressure is controlled and she says usually lower at home.  I do think she has an element of anxiety in the office.  I did not make any changes to her current medical regimen.  I encouraged her to continue with regular physical activity including low impact exercises like yoga.  She does not have any new anginal symptoms.  Continue with aggressive risk factor  modification.    Return in about 9 months (around 8/17/2023) for follow-up, earlier if any change in symptoms.  Available medical records reviewed and updated including laboratory data, imaging studies, previous outpatient and inpatient records.  Counseling/education performed. Care everywhere utilized where appropriate.    Thank you for allowing me to participate in the care of this patient.  I hope this information is helpful.      Blanca Lake MD [facility], [facility]  11/17/2022  6:18 PM  This document was generated utilizing voice recognition technology. Please excuse any typographical errors which may be present.

## 2022-11-17 NOTE — PROGRESS NOTES
"     Cardiology Office Visit     Patient ID: Bethanie Vela 77 y.o. female 1945  PCP: Eids Huang DO   History Present Illness     Bethanie Vela returns to the office today for follow-up of her abnormal EKG, hypertension and elevated cholesterol.  She says that she has had several medical issues, but they are noncardiac.  She developed sciatica but is improved with physical therapy, meloxicam and gabapentin.  At this point she says she feels \"about 70% better\".  She gets some numbness in her toes and was told that this is likely neuropathy.  She says that her fingers feel tingly and somewhat numb if she reaches into the freezer section.  Bethanie denies any exertional chest discomfort, PND or orthopnea.    Her EKG is unchanged.  Immediately before the visit she had an echocardiogram which shows preserved LV function with mild concentric LVH.  Overall there is no significant change.  We reviewed these findings today.    Allergies/Medications   Allergies:Patient has no known allergies.    Medications:  Current Outpatient Medications   Medication Instructions    aspirin 81 mg, oral, Daily    chlorthalidone (HYGROTEN) 25 mg, oral, Daily    ciclopirox (PENLAC) 8 % solution 1 application, Topical, As needed    gabapentin (NEURONTIN) 300 mg, oral, 2 times weekly, prn    ketoconazole (NIZORAL) 2 % shampoo 1 application, Topical, As needed    meloxicam (MOBIC) 7.5 mg, oral, Daily    nebivoloL (BYSTOLIC) 5 mg, oral, Daily    rosuvastatin (CRESTOR) 5 mg, oral, Daily    triamcinolone (KENALOG) 0.1 % lotion 1 application, Topical, As needed      Physical Exam     Vitals:    11/17/22 1311   BP: 140/72   BP Location: Left upper arm   Patient Position: Sitting   Pulse: 64   Resp: 18   SpO2: 96%   Weight: 76.7 kg (169 lb)   Height: 1.6 m (5' 3\")     BP Readings from Last 3 Encounters:   11/17/22 140/72   11/17/22 140/75   02/18/22 116/68     Wt Readings from Last 3 Encounters:   11/17/22 76.7 kg (169 lb) "   11/17/22 76.7 kg (169 lb)   02/18/22 72.6 kg (160 lb)      Physical Exam:  Constitutional: Well developed.  No apparent distress.  Looks younger than stated age.  Eyes: No icterus  ENT:  Mucosa moist  Neck: Supple, no JVD or hepatojugular reflux.  No bruits.  Cardiac: Normal S1 and S2, regular rate and rhythm, no S3.  No rub.  No ectopy.  There is a soft systolic murmur at the left sternal border.  Lungs: Clear to auscultation bilaterally.  No wheezing.  Abdomen: Soft, nontender, positive normoactive bowel sounds.  No bruit.  Extremities: No clubbing or cyanosis.  No calf tenderness.  No edema.  Distal pulses are intact.  Skin: Warm and dry.  No jaundice.  Musculoskeletal: No joint swelling or erythema.  Neurologic: Awake, alert, oriented.  Moving all extremities.  Psychiatric: No agitation.  Anxious about her health.    Labs/Imaging/Procedures   Labs  11/4/2022: Glucose 79, BUN 25, creatinine 1.23, GFR 45, sodium 140, potassium 4.1, chloride 103, CO2 27, LFTs unremarkable.  Total cholesterol 186, HDL 70, triglycerides 99, LDL 97 mg/dL.  WBC 6.7, hemoglobin 12.8, platelets 235,000, free T4 1, TSH 1.88    Imaging  Echo 11/17/2022:  Normal LV size and function with LVEF 70-75%.  Mild concentric LVH.  No LVOT gradient.  Abnormal septal motion, otherwise wall motion appears grossly normal.  Impaired relaxation. Normal RV size and function.  Mild TR.  Estimated RVSP 40 mmHg. The aortic valve is trileaflet and sclerotic.  No aortic stenosis.· Visualized portions aorta appear sclerotic but normal in caliber. The mitral valve leaflets are mildly thickened.  Trace to at most mild mitral regurgitation. Grossly normal pulmonic valve with trace regurgitation. The IVC is dilated with diminished respiratory collapse. Prominent pericardial fat pad, but no significant effusion. Results discussed with patient immediately following echocardiogram.  Overall no significant change.  IVC was not well visualized on the previous  study.    Echo 2/3/2020: Overall normal LV size and function with LVEF 60-65%.  Mild concentric LVH. Normal wall motion.  Impaired relaxation. Top normal RV size with preserved function.  Mild TR. Estimated PASP 25 mmHg. Trileaflet aortic valve.  No aortic stenosis.  Visualized portions of aorta appear sclerotic. Mildly thickened mitral valve leaflets with mild MR. No significant pericardial effusion.     Pharm MPI 7/11/2017: Probably normal study.  Pharmacologic EKG negative for ischemia or arrhythmia.  Small area of mildly reduced isotope uptake at the apex which is predominantly fixed.  Most likely breast attenuation.  LVEF 84% with probable LVH.     Exercise stress echo at outside institution 5/16/2017: Borderline positive EKG portion. Echocardiographic images negative for ischemia.  Below average exercise capacity.    EKG  Assessment/Plan     1. Abnormal EKG    2. Essential hypertension    3. Elevated cholesterol    4. Shortness of breath       Bethanie is doing well and we reviewed her echocardiogram results.  Her blood pressure is controlled and she says usually lower at home.  I do think she has an element of anxiety in the office.  I did not make any changes to her current medical regimen.  I encouraged her to continue with regular physical activity including low impact exercises like yoga.  She does not have any new anginal symptoms.  Continue with aggressive risk factor modification.    Return in about 9 months (around 8/17/2023) for follow-up, earlier if any change in symptoms.  Available medical records reviewed and updated including laboratory data, imaging studies, previous outpatient and inpatient records.  Counseling/education performed. Care everywhere utilized where appropriate.    Thank you for allowing me to participate in the care of this patient.  I hope this information is helpful.      Blanca Lake MD [facility], [facility]  11/17/2022  6:18 PM  This document was generated utilizing voice  recognition technology. Please excuse any typographical errors which may be present.

## 2022-12-08 DIAGNOSIS — I10 ESSENTIAL HYPERTENSION: ICD-10-CM

## 2022-12-08 RX ORDER — NEBIVOLOL 5 MG/1
5 TABLET ORAL DAILY
Qty: 90 TABLET | Refills: 3 | Status: SHIPPED | OUTPATIENT
Start: 2022-12-08 | End: 2023-01-26 | Stop reason: SDUPTHER

## 2023-01-26 DIAGNOSIS — I10 ESSENTIAL HYPERTENSION: ICD-10-CM

## 2023-01-26 RX ORDER — NEBIVOLOL 5 MG/1
5 TABLET ORAL DAILY
Qty: 90 TABLET | Refills: 3 | Status: SHIPPED | OUTPATIENT
Start: 2023-01-26 | End: 2024-01-19

## 2023-02-27 DIAGNOSIS — E78.00 ELEVATED CHOLESTEROL: ICD-10-CM

## 2023-02-27 RX ORDER — ROSUVASTATIN CALCIUM 5 MG/1
TABLET, COATED ORAL
Qty: 90 TABLET | Refills: 3 | Status: SHIPPED | OUTPATIENT
Start: 2023-02-27 | End: 2023-02-28 | Stop reason: SDUPTHER

## 2023-02-28 ENCOUNTER — TELEPHONE (OUTPATIENT)
Dept: CARDIOLOGY | Facility: CLINIC | Age: 78
End: 2023-02-28

## 2023-02-28 DIAGNOSIS — E78.00 ELEVATED CHOLESTEROL: ICD-10-CM

## 2023-02-28 RX ORDER — ROSUVASTATIN CALCIUM 5 MG/1
5 TABLET, COATED ORAL DAILY
Qty: 90 TABLET | Refills: 3 | Status: SHIPPED | OUTPATIENT
Start: 2023-02-28 | End: 2024-03-08

## 2023-02-28 NOTE — TELEPHONE ENCOUNTER
We received a message from the answering service that Bethanie needs a refill of her cholesterol medicine to the [facility] on file. Her last visit was 11-17-22 and her next visit is scheduled for 8-17-23. THX

## 2023-09-07 ENCOUNTER — OFFICE VISIT (OUTPATIENT)
Dept: CARDIOLOGY | Facility: CLINIC | Age: 78
End: 2023-09-07
Payer: COMMERCIAL

## 2023-09-07 VITALS
SYSTOLIC BLOOD PRESSURE: 124 MMHG | HEART RATE: 70 BPM | HEIGHT: 63 IN | RESPIRATION RATE: 18 BRPM | WEIGHT: 165 LBS | OXYGEN SATURATION: 97 % | DIASTOLIC BLOOD PRESSURE: 80 MMHG | BODY MASS INDEX: 29.23 KG/M2

## 2023-09-07 DIAGNOSIS — I10 ESSENTIAL HYPERTENSION: ICD-10-CM

## 2023-09-07 DIAGNOSIS — E78.00 ELEVATED CHOLESTEROL: ICD-10-CM

## 2023-09-07 DIAGNOSIS — R06.02 SHORTNESS OF BREATH: ICD-10-CM

## 2023-09-07 DIAGNOSIS — R94.31 ABNORMAL EKG: Primary | ICD-10-CM

## 2023-09-07 PROCEDURE — 3008F BODY MASS INDEX DOCD: CPT | Performed by: INTERNAL MEDICINE

## 2023-09-07 PROCEDURE — 3074F SYST BP LT 130 MM HG: CPT | Performed by: INTERNAL MEDICINE

## 2023-09-07 PROCEDURE — 3079F DIAST BP 80-89 MM HG: CPT | Performed by: INTERNAL MEDICINE

## 2023-09-07 PROCEDURE — 99214 OFFICE O/P EST MOD 30 MIN: CPT | Performed by: INTERNAL MEDICINE

## 2023-09-07 PROCEDURE — 93000 ELECTROCARDIOGRAM COMPLETE: CPT | Performed by: INTERNAL MEDICINE

## 2023-09-07 RX ORDER — CHLORTHALIDONE 25 MG/1
12.5 TABLET ORAL DAILY
COMMUNITY
Start: 2023-09-07 | End: 2023-12-20 | Stop reason: SDUPTHER

## 2023-09-07 NOTE — PATIENT INSTRUCTIONS
Patient Education   Mediterranean Diet  A Mediterranean diet refers to food and lifestyle choices that are based on the traditions of countries located on the [place]. It focuses on eating more fruits, vegetables, whole grains, beans, nuts, seeds, and heart-healthy fats, and eating less dairy, meat, eggs, and processed foods with added sugar, salt, and fat. This way of eating has been shown to help prevent certain conditions and improve outcomes for people who have chronic diseases, like kidney disease and heart disease.  What are tips for following this plan?  Reading food labels  Check the serving size of packaged foods. For foods such as rice and pasta, the serving size refers to the amount of cooked product, not dry.  Check the total fat in packaged foods. Avoid foods that have saturated fat or trans fats.  Check the ingredient list for added sugars, such as corn syrup.  Shopping    Buy a variety of foods that offer a balanced diet, including:  Fresh fruits and vegetables (produce).  Grains, beans, nuts, and seeds. Some of these may be available in unpackaged forms or large amounts (in bulk).  Fresh seafood.  Poultry and eggs.  Low-fat dairy products.  Buy whole ingredients instead of prepackaged foods.  Buy fresh fruits and vegetables in-season from local [employer] markets.  Buy plain frozen fruits and vegetables.  If you do not have access to quality fresh seafood, buy precooked frozen shrimp or canned fish, such as tuna, salmon, or sardines.  Stock your pantry so you always have certain foods on hand, such as olive oil, canned tuna, canned tomatoes, rice, pasta, and beans.  Cooking  Cook foods with extra-virgin olive oil instead of using butter or other vegetable oils.  Have meat as a side dish, and have vegetables or grains as your main dish. This means having meat in small portions or adding small amounts of meat to foods like pasta or stew.  Use beans or vegetables instead of meat in common dishes  like chili or lasagna.  [place] with different cooking methods. Try roasting, broiling, steaming, and sautéing vegetables.  Add frozen vegetables to soups, stews, pasta, or rice.  Add nuts or seeds for added healthy fats and plant protein at each meal. You can add these to yogurt, salads, or vegetable dishes.  Marinate fish or vegetables using olive oil, lemon juice, garlic, and fresh herbs.  Meal planning  Plan to eat one vegetarian meal one day each week. Try to work up to two vegetarian meals, if possible.  Eat seafood two or more times a week.  Have healthy snacks readily available, such as:  Vegetable sticks with hummus.  Greek yogurt.  Fruit and nut trail mix.  Eat balanced meals throughout the week. This includes:  Fruit: 2-3 servings a day.  Vegetables: 4-5 servings a day.  Low-fat dairy: 2 servings a day.  Fish, poultry, or lean meat: 1 serving a day.  Beans and legumes: 2 or more servings a week.  Nuts and seeds: 1-2 servings a day.  Whole grains: 6-8 servings a day.  Extra-virgin olive oil: 3-4 servings a day.  Limit red meat and sweets to only a few servings a month.  Lifestyle    Cook and eat meals together with your family, when possible.  Drink enough fluid to keep your urine pale yellow.  Be physically active every day. This includes:  Aerobic exercise like running or swimming.  Leisure activities like gardening, walking, or housework.  Get 7-8 hours of sleep each night.  If recommended by your health care provider, drink red wine in moderation. This means 1 glass a day for nonpregnant women and 2 glasses a day for men. A glass of wine equals 5 oz (150 mL).  What foods should I eat?  Fruits  Apples. Apricots. Avocado. Berries. Bananas. Cherries. Dates. Figs. Grapes. Nya. Melon. Oranges. Peaches. Plums. Pomegranate.  Vegetables  Artichokes. Beets. Broccoli. Cabbage. Carrots. Eggplant. Green beans. Chard. Kale. Spinach. Onions. Leeks. Peas. Squash. Tomatoes. Peppers.  Radishes.  Grains  Whole-grain pasta. Brown rice. Bulgur wheat. Polenta. Couscous. Whole-wheat bread. Oatmeal. Quinoa.  Meats and other proteins  Beans. Almonds. Sunflower seeds. Pine nuts. Peanuts. Cod. [place]. Scallops. Shrimp. Tuna. Tilapia. Clams. Oysters. Eggs. Poultry without skin.  Dairy  Low-fat milk. Cheese. Greek yogurt.  Fats and oils  Extra-virgin olive oil. Avocado oil. Grapeseed oil.  Beverages  Water. Red wine. Herbal tea.  Sweets and desserts  Greek yogurt with honey. Baked apples. Poached pears. Trail mix.  Seasonings and condiments  Basil. Cilantro. Coriander. Cumin. Mint. Parsley. Santana. Rosemary. Tarragon. Garlic. Oregano. Thyme. Pepper. Balsamic vinegar. Tahini. Hummus. Tomato sauce. Olives. Mushrooms.  The items listed above may not be a complete list of foods and beverages you can eat. Contact a dietitian for more information.  What foods should I limit?  This is a list of foods that should be eaten rarely or only on special occasions.  Fruits  Fruit canned in syrup.  Vegetables  Deep-fried potatoes (french fries).  Grains  Prepackaged pasta or rice dishes. Prepackaged cereal with added sugar. Prepackaged snacks with added sugar.  Meats and other proteins  Beef. Pork. Lamb. Poultry with skin. Hot dogs. Little.  Dairy  Ice cream. Sour cream. Whole milk.  Fats and oils  Butter. Canola oil. Vegetable oil. Beef fat (tallow). Lard.  Beverages  Juice. Sugar-sweetened soft drinks. Beer. Liquor and spirits.  Sweets and desserts  Cookies. Cakes. Pies. Candy.  Seasonings and condiments  Mayonnaise. Pre-made sauces and marinades.  The items listed above may not be a complete list of foods and beverages you should limit. Contact a dietitian for more information.  Summary  The Mediterranean diet includes both food and lifestyle choices.  Eat a variety of fresh fruits and vegetables, beans, nuts, seeds, and whole grains.  Limit the amount of red meat and sweets that you eat.  If recommended by your health  care provider, drink red wine in moderation. This means 1 glass a day for nonpregnant women and 2 glasses a day for men. A glass of wine equals 5 oz (150 mL).  This information is not intended to replace advice given to you by your health care provider. Make sure you discuss any questions you have with your health care provider.  Document Revised: 01/22/2021 Document Reviewed: 11/19/2020  Elsevier Patient Education © 2023 [employer]

## 2023-09-07 NOTE — LETTER
"September 14, 2023     Edis Huang DO  [address]  [place] [id number]    Patient: Bethanie Vela  YOB: 1945  Date of Visit: 9/7/2023      Dear Dr. Huang:    Thank you for referring Bethanie Vela to me for evaluation. Below are my notes for this consultation.    If you have questions, please do not hesitate to call me. I look forward to following your patient along with you.         Sincerely,        Blanca Lake MD        CC: No Recipients    Blanca Silva MD  9/14/2023  9:46 AM  Signed       Cardiology Office Visit     Patient ID: Bethanie Vela 77 y.o. female 1945  PCP: Edis Huang DO   History Present Illness     Bethanie Vela returns to the office today for follow-up of her abnormal EKG, hypertension and elevated cholesterol.  She says that she \"just has not been feeling well\" and is concerned that this could be related to her heart.  She does not have any exertional chest discomfort, but she is concerned about her heart in general.  She has chronic shortness of breath, but no PND or orthopnea.  She has had some atypical feelings in her chest, but I think it possible these could be more GI in nature.  She feels very fatigued.  No syncope or presyncope.  Some occasional lightheadedness.    Allergies/Medications   Allergies:Patient has no known allergies.    Medications:    aspirin, Take 81 mg by mouth daily.    chlorthalidone, Take 0.5 tablets (12.5 mg total) by mouth daily.    nebivoloL, Take 1 tablet (5 mg total) by mouth daily.    rosuvastatin, Take 1 tablet (5 mg total) by mouth daily.     Physical Exam     Vitals:    09/07/23 1316 09/07/23 1338 09/07/23 1339   BP: (!) 150/90 122/82 124/80   BP Location: Left upper arm Right upper arm Left upper arm   Patient Position: Sitting     Pulse: 70     Resp: 18     SpO2: 97%     Weight: 74.8 kg (165 lb)     Height: 1.6 m (5' 3\")       BP Readings from Last 3 Encounters:   09/07/23 124/80 "   11/17/22 140/75   11/17/22 140/72     Wt Readings from Last 3 Encounters:   09/07/23 74.8 kg (165 lb)   11/17/22 76.7 kg (169 lb)   11/17/22 76.7 kg (169 lb)      Physical Exam:  Constitutional: Well developed.  No apparent distress.  Looks younger than stated age.  Eyes: No icterus  ENT:  Mucosa moist  Neck: Supple, no JVD or hepatojugular reflux.  No bruits.  Cardiac: Normal S1 and S2, regular rate and rhythm, no S3.  No rub.  No ectopy.  There is a soft systolic murmur at the left sternal border.  Lungs: Clear to auscultation bilaterally.  No wheezing.  Abdomen: Soft, nontender, positive normoactive bowel sounds.  No bruit.  Extremities: No clubbing or cyanosis.  No calf tenderness.  No edema.  Distal pulses are intact.  Skin: Warm and dry.  No jaundice.  Musculoskeletal: No joint swelling or erythema.  Neurologic: Awake, alert, oriented.  Moving all extremities.  Psychiatric: No agitation.  Anxious about her health.    Labs/Imaging/Procedures   Labs  8/17/2023: Hemoglobin A1c 6.2, creatinine 1.4, GFR 39    11/4/2022: Glucose 79, BUN 25, creatinine 1.23, GFR 45, sodium 140, potassium 4.1, chloride 103, CO2 27, LFTs unremarkable.  Total cholesterol 186, HDL 70, triglycerides 99, LDL 97 mg/dL.  WBC 6.7, hemoglobin 12.8, platelets 235,000, free T4 1, TSH 1.88    Imaging  Echo 11/17/2022:  Normal LV size and function with LVEF 70-75%.  Mild concentric LVH.  No LVOT gradient.  Abnormal septal motion, otherwise wall motion appears grossly normal.  Impaired relaxation. Normal RV size and function.  Mild TR.  Estimated RVSP 40 mmHg. The aortic valve is trileaflet and sclerotic.  No aortic stenosis.· Visualized portions aorta appear sclerotic but normal in caliber. The mitral valve leaflets are mildly thickened.  Trace to at most mild mitral regurgitation. Grossly normal pulmonic valve with trace regurgitation. The IVC is dilated with diminished respiratory collapse. Prominent pericardial fat pad, but no significant  effusion. Results discussed with patient immediately following echocardiogram.  Overall no significant change.  IVC was not well visualized on the previous study.    Echo 2/3/2020: Overall normal LV size and function with LVEF 60-65%.  Mild concentric LVH. Normal wall motion.  Impaired relaxation. Top normal RV size with preserved function.  Mild TR. Estimated PASP 25 mmHg. Trileaflet aortic valve.  No aortic stenosis.  Visualized portions of aorta appear sclerotic. Mildly thickened mitral valve leaflets with mild MR. No significant pericardial effusion.     Pharm MPI 7/11/2017: Probably normal study.  Pharmacologic EKG negative for ischemia or arrhythmia.  Small area of mildly reduced isotope uptake at the apex which is predominantly fixed.  Most likely breast attenuation.  LVEF 84% with probable LVH.     Exercise stress echo at outside institution 5/16/2017: Borderline positive EKG portion. Echocardiographic images negative for ischemia.  Below average exercise capacity.    EKG  Assessment/Plan     1. Abnormal EKG    2. Essential hypertension    3. Elevated cholesterol    4. Shortness of breath       Bethanie is not experiencing any exertional angina, but does have chronic dyspnea on exertion.  She is concerned about her overall health, but I do not think she is experiencing any new cardiovascular symptoms.  She has no change in her EKG-the ST-T abnormalities are present at baseline.    I am recommending that she decrease chlorthalidone to 12.5 mg daily-I am not clear if she was taking 25 mg daily or the half tablet, but she thinks that she may have been taking a whole tablet.  This will also help with her renal function.  I am continuing the same dose of nebivolol-cannot really titrate up based on her heart rate.  She remains on rosuvastatin and her last LDL was 97 mg/dL-I do not have a more updated set of labs for her cholesterol.    She will contact me if there is any problem with the medication change.  I  suggested that she check her blood pressures at home and she should call us or send a portal message if she is consistently getting readings of 140/80 or greater following exercise.    Return in about 6 months (around 3/7/2024) for follow-up, earlier if any change in symptoms.  Available medical records reviewed and updated including laboratory data, imaging studies, previous outpatient and inpatient records.  Counseling/education performed. Care everywhere utilized where appropriate.    Thank you for allowing me to participate in the care of this patient.  I hope this information is helpful.      Blanca Lake MD [facility], [facility]  9/14/2023  9:37 AM  This document was generated utilizing voice recognition technology. Please excuse any typographical errors which may be present.

## 2023-09-09 ENCOUNTER — TELEPHONE (OUTPATIENT)
Dept: CARDIOLOGY | Facility: CLINIC | Age: 78
End: 2023-09-09
Payer: COMMERCIAL

## 2023-09-09 NOTE — TELEPHONE ENCOUNTER
"Patient called answering service \"medication received from pharmacy looks different\".    Called patient to discuss. She reports that her rosuvastatin previously was a small pink pill and now it is a larger white pill with \"rosuvastatin calcium\" written on label.    I informed patient that pharmacies may use different manufacturers for medications from time to time and that it is not uncommon for medication to change. I recommended that she call the pharmacy to double check that she was administered the correct dosage. She stated understanding.    Eunice Bell MD  Fellow in Cardiovascular Disease  PGY-5  "

## 2023-09-14 NOTE — PROGRESS NOTES
"     Cardiology Office Visit     Patient ID: Bethanie Vela 77 y.o. female 1945  PCP: Edis Huang DO   History Present Illness     Bethanie Vela returns to the office today for follow-up of her abnormal EKG, hypertension and elevated cholesterol.  She says that she \"just has not been feeling well\" and is concerned that this could be related to her heart.  She does not have any exertional chest discomfort, but she is concerned about her heart in general.  She has chronic shortness of breath, but no PND or orthopnea.  She has had some atypical feelings in her chest, but I think it possible these could be more GI in nature.  She feels very fatigued.  No syncope or presyncope.  Some occasional lightheadedness.    Allergies/Medications   Allergies:Patient has no known allergies.    Medications:    aspirin, Take 81 mg by mouth daily.    chlorthalidone, Take 0.5 tablets (12.5 mg total) by mouth daily.    nebivoloL, Take 1 tablet (5 mg total) by mouth daily.    rosuvastatin, Take 1 tablet (5 mg total) by mouth daily.     Physical Exam     Vitals:    09/07/23 1316 09/07/23 1338 09/07/23 1339   BP: (!) 150/90 122/82 124/80   BP Location: Left upper arm Right upper arm Left upper arm   Patient Position: Sitting     Pulse: 70     Resp: 18     SpO2: 97%     Weight: 74.8 kg (165 lb)     Height: 1.6 m (5' 3\")       BP Readings from Last 3 Encounters:   09/07/23 124/80   11/17/22 140/75   11/17/22 140/72     Wt Readings from Last 3 Encounters:   09/07/23 74.8 kg (165 lb)   11/17/22 76.7 kg (169 lb)   11/17/22 76.7 kg (169 lb)      Physical Exam:  Constitutional: Well developed.  No apparent distress.  Looks younger than stated age.  Eyes: No icterus  ENT:  Mucosa moist  Neck: Supple, no JVD or hepatojugular reflux.  No bruits.  Cardiac: Normal S1 and S2, regular rate and rhythm, no S3.  No rub.  No ectopy.  There is a soft systolic murmur at the left sternal border.  Lungs: Clear to auscultation bilaterally.  " No wheezing.  Abdomen: Soft, nontender, positive normoactive bowel sounds.  No bruit.  Extremities: No clubbing or cyanosis.  No calf tenderness.  No edema.  Distal pulses are intact.  Skin: Warm and dry.  No jaundice.  Musculoskeletal: No joint swelling or erythema.  Neurologic: Awake, alert, oriented.  Moving all extremities.  Psychiatric: No agitation.  Anxious about her health.    Labs/Imaging/Procedures   Labs  8/17/2023: Hemoglobin A1c 6.2, creatinine 1.4, GFR 39    11/4/2022: Glucose 79, BUN 25, creatinine 1.23, GFR 45, sodium 140, potassium 4.1, chloride 103, CO2 27, LFTs unremarkable.  Total cholesterol 186, HDL 70, triglycerides 99, LDL 97 mg/dL.  WBC 6.7, hemoglobin 12.8, platelets 235,000, free T4 1, TSH 1.88    Imaging  Echo 11/17/2022:  Normal LV size and function with LVEF 70-75%.  Mild concentric LVH.  No LVOT gradient.  Abnormal septal motion, otherwise wall motion appears grossly normal.  Impaired relaxation. Normal RV size and function.  Mild TR.  Estimated RVSP 40 mmHg. The aortic valve is trileaflet and sclerotic.  No aortic stenosis.· Visualized portions aorta appear sclerotic but normal in caliber. The mitral valve leaflets are mildly thickened.  Trace to at most mild mitral regurgitation. Grossly normal pulmonic valve with trace regurgitation. The IVC is dilated with diminished respiratory collapse. Prominent pericardial fat pad, but no significant effusion. Results discussed with patient immediately following echocardiogram.  Overall no significant change.  IVC was not well visualized on the previous study.    Echo 2/3/2020: Overall normal LV size and function with LVEF 60-65%.  Mild concentric LVH. Normal wall motion.  Impaired relaxation. Top normal RV size with preserved function.  Mild TR. Estimated PASP 25 mmHg. Trileaflet aortic valve.  No aortic stenosis.  Visualized portions of aorta appear sclerotic. Mildly thickened mitral valve leaflets with mild MR. No significant pericardial  effusion.     Pharm MPI 7/11/2017: Probably normal study.  Pharmacologic EKG negative for ischemia or arrhythmia.  Small area of mildly reduced isotope uptake at the apex which is predominantly fixed.  Most likely breast attenuation.  LVEF 84% with probable LVH.     Exercise stress echo at outside institution 5/16/2017: Borderline positive EKG portion. Echocardiographic images negative for ischemia.  Below average exercise capacity.    EKG  Assessment/Plan     1. Abnormal EKG    2. Essential hypertension    3. Elevated cholesterol    4. Shortness of breath       Bethanie is not experiencing any exertional angina, but does have chronic dyspnea on exertion.  She is concerned about her overall health, but I do not think she is experiencing any new cardiovascular symptoms.  She has no change in her EKG-the ST-T abnormalities are present at baseline.    I am recommending that she decrease chlorthalidone to 12.5 mg daily-I am not clear if she was taking 25 mg daily or the half tablet, but she thinks that she may have been taking a whole tablet.  This will also help with her renal function.  I am continuing the same dose of nebivolol-cannot really titrate up based on her heart rate.  She remains on rosuvastatin and her last LDL was 97 mg/dL-I do not have a more updated set of labs for her cholesterol.    She will contact me if there is any problem with the medication change.  I suggested that she check her blood pressures at home and she should call us or send a portal message if she is consistently getting readings of 140/80 or greater following exercise.    Return in about 6 months (around 3/7/2024) for follow-up, earlier if any change in symptoms.  Available medical records reviewed and updated including laboratory data, imaging studies, previous outpatient and inpatient records.  Counseling/education performed. Care everywhere utilized where appropriate.    Thank you for allowing me to participate in the care of this  patient.  I hope this information is helpful.      Blanca Lake MD [facility], [facility]  9/14/2023  9:37 AM  This document was generated utilizing voice recognition technology. Please excuse any typographical errors which may be present.

## 2023-12-20 DIAGNOSIS — I10 ESSENTIAL HYPERTENSION: ICD-10-CM

## 2023-12-20 RX ORDER — CHLORTHALIDONE 25 MG/1
12.5 TABLET ORAL DAILY
Qty: 45 TABLET | Refills: 3 | Status: SHIPPED | OUTPATIENT
Start: 2023-12-20 | End: 2024-09-19 | Stop reason: SDUPTHER

## 2023-12-20 NOTE — TELEPHONE ENCOUNTER
"Medicine Refill Request [facility]    Name of Patient: Bethanie Weston    Caller's name/Relationship: Self    Callback number: [phone]    Medication Name, Dosage, Supply:   chlorthalidone (HYGROTON) 25 mg tablet    Quantity left: less then a wk    Pharmacy: [id number]    Last Office Visit: 9/7/23  Last Consult Visit: Visit date not found  Last Telemedicine Visit: Visit date not found    Next Appointment: Visit date not found      Current Outpatient Medications:   •  aspirin 81 mg enteric coated tablet, Take 81 mg by mouth daily., Disp: , Rfl:   •  chlorthalidone (HYGROTON) 25 mg tablet, Take 0.5 tablets (12.5 mg total) by mouth daily., Disp: , Rfl:   •  nebivoloL (BYSTOLIC) 5 mg tablet, Take 1 tablet (5 mg total) by mouth daily., Disp: 90 tablet, Rfl: 3  •  rosuvastatin (CRESTOR) 5 mg tablet, Take 1 tablet (5 mg total) by mouth daily., Disp: 90 tablet, Rfl: 3      BP Readings from Last 3 Encounters:   09/07/23 124/80   11/17/22 140/75   11/17/22 140/72       Recent Lab results:  Lab Results   Component Value Date    CHOL 197 02/14/2022   ,   Lab Results   Component Value Date    HDL 78 02/14/2022   ,   Lab Results   Component Value Date    LDLCALC 100 (H) 02/14/2022   ,   Lab Results   Component Value Date    TRIG 92 02/14/2022        No results found for: \"GLUCOSE\", No results found for: \"HGBA1C\"      No results found for: \"CREATININE\"    No results found for: \"TSH\"        "

## 2024-03-05 NOTE — PROGRESS NOTES
"     Cardiology Office Visit     Patient ID: Bethanie Vela 78 y.o. female 1945  PCP: Edis Huang, DO   History Present Illness     Bethanie Vela presents to the office today for follow-up of her abnormal EKG, hypertension and elevated cholesterol.  She says that she has been on a wellness program which sounds like some structured dietary recommendations and regular walking for exercise.  This has resulted in about a 10 pound weight loss.  At her last visit, we decreased her chlorthalidone because of an increase in her creatinine and she wants to make sure that her blood pressure is \"okay\".  She says she was at a health fair and there was a difference between her arms, but her blood pressure was checked 4 times today in separate arms and there is no significant change.  She denies any exertional chest pain and says that she is feeling \"okay\".  We reviewed her most recent lab work and her A1c is 6.7.  LDL is 89 mg/dL.  This was done several months ago, so her numbers may look a little better.    She denies any PND, orthopnea or hospitalizations for any cardiac issues.  She states she is compliant with her medications.  No chest pain.  EKG is unchanged.    Allergies/Medications   Allergies:Patient has no known allergies.    Medications:  •  aspirin, Take 81 mg by mouth daily.  •  chlorthalidone, Take 0.5 tablets (12.5 mg total) by mouth daily.  •  nebivoloL, Take 1 tablet (5 mg total) by mouth daily.  •  rosuvastatin, Take 1 tablet (5 mg total) by mouth daily.      Physical Exam     Vitals:    03/07/24 1324 03/07/24 1325 03/07/24 1337 03/07/24 1338   BP: 134/82 134/84 126/82 128/82   BP Location: Left upper arm Right upper arm Left upper arm Right upper arm   Patient Position: Sitting Sitting     Pulse: (!) 59      Resp: 18      SpO2: 98%      Weight: 69.9 kg (154 lb)      Height: 1.6 m (5' 3\")        BP Readings from Last 3 Encounters:   03/07/24 128/82   09/07/23 124/80   11/17/22 140/75     Wt " Readings from Last 3 Encounters:   03/07/24 69.9 kg (154 lb)   09/07/23 74.8 kg (165 lb)   11/17/22 76.7 kg (169 lb)      Physical Exam:  Constitutional: Well developed.  No apparent distress. Looks younger than stated age.  Eyes: No icterus  ENT:  Mucosa moist  Neck: Supple, no JVD or hepatojugular reflux.  No bruits.  Cardiac: Normal S1 and S2, regular rate and rhythm, no S3.  No rub.  No ectopy.  There is a soft systolic murmur at the left sternal border.  Lungs: Clear to auscultation bilaterally.  No wheezing.  Abdomen: Soft, nontender, positive normoactive bowel sounds.  No bruit.  Extremities: No clubbing or cyanosis.  No calf tenderness.  No edema.  Distal pulses are intact.  Skin: Warm and dry.  No jaundice.  Musculoskeletal: No joint swelling or erythema.  Neurologic: Awake, alert, oriented.  Moving all extremities.  Psychiatric: No agitation.  Anxious about her health.    Labs/Imaging/Procedures   Labs  11/11/2023: Glucose 94, BUN 16, creatinine 1.48, potassium 4, LFTs unremarkable.  Total cholesterol 181, triglycerides 167, HDL 64 and LDL 89 mg/dL.    8/17/2023: Hemoglobin A1c 6.2, creatinine 1.4, GFR 39     11/4/2022: Glucose 79, BUN 25, creatinine 1.23, GFR 45, sodium 140, potassium 4.1, chloride 103, CO2 27, LFTs unremarkable.  Total cholesterol 186, HDL 70, triglycerides 99, LDL 97 mg/dL.  WBC 6.7, hemoglobin 12.8, platelets 235,000, free T4 1, TSH 1.88    Imaging  Echo 11/17/2022:  Normal LV size and function with LVEF 70-75%.  Mild concentric LVH.  No LVOT gradient.  Abnormal septal motion, otherwise wall motion appears grossly normal.  Impaired relaxation. Normal RV size and function.  Mild TR.  Estimated RVSP 40 mmHg. The aortic valve is trileaflet and sclerotic.  No aortic stenosis.· Visualized portions aorta appear sclerotic but normal in caliber. The mitral valve leaflets are mildly thickened.  Trace to at most mild mitral regurgitation. Grossly normal pulmonic valve with trace regurgitation.  The IVC is dilated with diminished respiratory collapse. Prominent pericardial fat pad, but no significant effusion. Results discussed with patient immediately following echocardiogram.  Overall no significant change.  IVC was not well visualized on the previous study.    Echo 2/3/2020: Overall normal LV size and function with LVEF 60-65%.  Mild concentric LVH. Normal wall motion.  Impaired relaxation. Top normal RV size with preserved function.  Mild TR. Estimated PASP 25 mmHg. Trileaflet aortic valve.  No aortic stenosis.  Visualized portions of aorta appear sclerotic. Mildly thickened mitral valve leaflets with mild MR. No significant pericardial effusion.     Pharm MPI 7/11/2017: Probably normal study.  Pharmacologic EKG negative for ischemia or arrhythmia.  Small area of mildly reduced isotope uptake at the apex which is predominantly fixed.  Most likely breast attenuation.  LVEF 84% with probable LVH.     Exercise stress echo at outside institution 5/16/2017: Borderline positive EKG portion. Echocardiographic images negative for ischemia.  Below average exercise capacity.      EKG  Assessment/Plan     1. Abnormal EKG    2. Essential hypertension    3. Elevated cholesterol    4. Shortness of breath       Bethanie is doing well.  Her blood pressure is well-controlled on the current regimen and reduced dose of chlorthalidone.  I am surprised that her creatinine has not come down more, but she says she is making an effort to drink more water.  Hopefully her next set of blood work will look even better with her exercise and weight loss.  Her EKG is abnormal at baseline but does not show any significant change.  She has no new anginal symptoms and is doing at least 5000 steps per day.  She has no evidence of volume overload.  At her next visit, we will recheck her echocardiogram for assessment of her left ventricular function and LVH.  Bethanie had multiple questions about the meaning of certain lab values, A1c,  etc., all of which were answered.    Return in about 6 months (around 9/7/2024) for follow-up, earlier if any change in symptoms.  Available medical records reviewed and updated including laboratory data, imaging studies, previous outpatient and inpatient records.  Counseling/education performed. Care everywhere utilized where appropriate.    Thank you for allowing me to participate in the care of this patient.  I hope this information is helpful.      Blanca Lake MD [facility], [facility]  3/7/2024  1:53 PM     By signing my name below, I, Grace Givens, attest that this documentation has been prepared under the direction and in the presence of MD Grace Lopez  3/7/2024 1:53 PM    This document was generated utilizing voice recognition technology. Please excuse any typographical errors which may be present.

## 2024-03-07 ENCOUNTER — OFFICE VISIT (OUTPATIENT)
Dept: CARDIOLOGY | Facility: CLINIC | Age: 79
End: 2024-03-07
Payer: COMMERCIAL

## 2024-03-07 VITALS
HEART RATE: 59 BPM | SYSTOLIC BLOOD PRESSURE: 128 MMHG | RESPIRATION RATE: 18 BRPM | BODY MASS INDEX: 27.29 KG/M2 | OXYGEN SATURATION: 98 % | HEIGHT: 63 IN | WEIGHT: 154 LBS | DIASTOLIC BLOOD PRESSURE: 82 MMHG

## 2024-03-07 DIAGNOSIS — I10 ESSENTIAL HYPERTENSION: ICD-10-CM

## 2024-03-07 DIAGNOSIS — R06.02 SHORTNESS OF BREATH: ICD-10-CM

## 2024-03-07 DIAGNOSIS — R94.31 ABNORMAL EKG: Primary | ICD-10-CM

## 2024-03-07 DIAGNOSIS — E78.00 ELEVATED CHOLESTEROL: ICD-10-CM

## 2024-03-07 LAB
ATRIAL RATE: 57
P AXIS: 59
PR INTERVAL: 172
QRS DURATION: 74
QT INTERVAL: 410
QTC CALCULATION(BAZETT): 399
R AXIS: 50
T WAVE AXIS: 103
VENTRICULAR RATE: 57

## 2024-03-07 PROCEDURE — 3079F DIAST BP 80-89 MM HG: CPT | Performed by: INTERNAL MEDICINE

## 2024-03-07 PROCEDURE — 3074F SYST BP LT 130 MM HG: CPT | Performed by: INTERNAL MEDICINE

## 2024-03-07 PROCEDURE — 99214 OFFICE O/P EST MOD 30 MIN: CPT | Performed by: INTERNAL MEDICINE

## 2024-03-07 PROCEDURE — 93000 ELECTROCARDIOGRAM COMPLETE: CPT | Performed by: INTERNAL MEDICINE

## 2024-03-07 PROCEDURE — 3008F BODY MASS INDEX DOCD: CPT | Performed by: INTERNAL MEDICINE

## 2024-03-07 RX ORDER — NEBIVOLOL 5 MG/1
5 TABLET ORAL DAILY
Qty: 90 TABLET | Refills: 3 | Status: SHIPPED | OUTPATIENT
Start: 2024-03-07 | End: 2024-09-19 | Stop reason: SDUPTHER

## 2024-03-07 RX ORDER — NEBIVOLOL 5 MG/1
5 TABLET ORAL DAILY
Qty: 90 TABLET | Refills: 3 | COMMUNITY
Start: 2024-03-07 | End: 2024-03-07 | Stop reason: SDUPTHER

## 2024-03-07 NOTE — LETTER
"March 7, 2024     Edis Huang DO  [address]  [place] [id number]    Patient: Bethanie Vela  YOB: 1945  Date of Visit: 3/7/2024      Dear Dr. Huang:    Thank you for referring Bethanie Vela to me for evaluation. Below are my notes for this consultation.    If you have questions, please do not hesitate to call me. I look forward to following your patient along with you.         Sincerely,        Blanca Lake MD        CC: No Recipients    Blanca Silva MD  3/7/2024  1:58 PM  Signed       Cardiology Office Visit     Patient ID: Bethanie Vela 78 y.o. female 1945  PCP: Edis Huang DO   History Present Illness     Bethanie Vela presents to the office today for follow-up of her abnormal EKG, hypertension and elevated cholesterol.  She says that she has been on a wellness program which sounds like some structured dietary recommendations and regular walking for exercise.  This has resulted in about a 10 pound weight loss.  At her last visit, we decreased her chlorthalidone because of an increase in her creatinine and she wants to make sure that her blood pressure is \"okay\".  She says she was at a health fair and there was a difference between her arms, but her blood pressure was checked 4 times today in separate arms and there is no significant change.  She denies any exertional chest pain and says that she is feeling \"okay\".  We reviewed her most recent lab work and her A1c is 6.7.  LDL is 89 mg/dL.  This was done several months ago, so her numbers may look a little better.    She denies any PND, orthopnea or hospitalizations for any cardiac issues.  She states she is compliant with her medications.  No chest pain.  EKG is unchanged.    Allergies/Medications   Allergies:Patient has no known allergies.    Medications:  •  aspirin, Take 81 mg by mouth daily.  •  chlorthalidone, Take 0.5 tablets (12.5 mg total) by mouth daily.  •  nebivoloL, Take 1 tablet " "(5 mg total) by mouth daily.  •  rosuvastatin, Take 1 tablet (5 mg total) by mouth daily.      Physical Exam     Vitals:    03/07/24 1324 03/07/24 1325 03/07/24 1337 03/07/24 1338   BP: 134/82 134/84 126/82 128/82   BP Location: Left upper arm Right upper arm Left upper arm Right upper arm   Patient Position: Sitting Sitting     Pulse: (!) 59      Resp: 18      SpO2: 98%      Weight: 69.9 kg (154 lb)      Height: 1.6 m (5' 3\")        BP Readings from Last 3 Encounters:   03/07/24 128/82   09/07/23 124/80   11/17/22 140/75     Wt Readings from Last 3 Encounters:   03/07/24 69.9 kg (154 lb)   09/07/23 74.8 kg (165 lb)   11/17/22 76.7 kg (169 lb)      Physical Exam:  Constitutional: Well developed.  No apparent distress. Looks younger than stated age.  Eyes: No icterus  ENT:  Mucosa moist  Neck: Supple, no JVD or hepatojugular reflux.  No bruits.  Cardiac: Normal S1 and S2, regular rate and rhythm, no S3.  No rub.  No ectopy.  There is a soft systolic murmur at the left sternal border.  Lungs: Clear to auscultation bilaterally.  No wheezing.  Abdomen: Soft, nontender, positive normoactive bowel sounds.  No bruit.  Extremities: No clubbing or cyanosis.  No calf tenderness.  No edema.  Distal pulses are intact.  Skin: Warm and dry.  No jaundice.  Musculoskeletal: No joint swelling or erythema.  Neurologic: Awake, alert, oriented.  Moving all extremities.  Psychiatric: No agitation.  Anxious about her health.    Labs/Imaging/Procedures   Labs  11/11/2023: Glucose 94, BUN 16, creatinine 1.48, potassium 4, LFTs unremarkable.  Total cholesterol 181, triglycerides 167, HDL 64 and LDL 89 mg/dL.    8/17/2023: Hemoglobin A1c 6.2, creatinine 1.4, GFR 39     11/4/2022: Glucose 79, BUN 25, creatinine 1.23, GFR 45, sodium 140, potassium 4.1, chloride 103, CO2 27, LFTs unremarkable.  Total cholesterol 186, HDL 70, triglycerides 99, LDL 97 mg/dL.  WBC 6.7, hemoglobin 12.8, platelets 235,000, free T4 1, TSH 1.88    Imaging  Echo " 11/17/2022:  Normal LV size and function with LVEF 70-75%.  Mild concentric LVH.  No LVOT gradient.  Abnormal septal motion, otherwise wall motion appears grossly normal.  Impaired relaxation. Normal RV size and function.  Mild TR.  Estimated RVSP 40 mmHg. The aortic valve is trileaflet and sclerotic.  No aortic stenosis.· Visualized portions aorta appear sclerotic but normal in caliber. The mitral valve leaflets are mildly thickened.  Trace to at most mild mitral regurgitation. Grossly normal pulmonic valve with trace regurgitation. The IVC is dilated with diminished respiratory collapse. Prominent pericardial fat pad, but no significant effusion. Results discussed with patient immediately following echocardiogram.  Overall no significant change.  IVC was not well visualized on the previous study.    Echo 2/3/2020: Overall normal LV size and function with LVEF 60-65%.  Mild concentric LVH. Normal wall motion.  Impaired relaxation. Top normal RV size with preserved function.  Mild TR. Estimated PASP 25 mmHg. Trileaflet aortic valve.  No aortic stenosis.  Visualized portions of aorta appear sclerotic. Mildly thickened mitral valve leaflets with mild MR. No significant pericardial effusion.     Pharm MPI 7/11/2017: Probably normal study.  Pharmacologic EKG negative for ischemia or arrhythmia.  Small area of mildly reduced isotope uptake at the apex which is predominantly fixed.  Most likely breast attenuation.  LVEF 84% with probable LVH.     Exercise stress echo at outside institution 5/16/2017: Borderline positive EKG portion. Echocardiographic images negative for ischemia.  Below average exercise capacity.      EKG  Assessment/Plan     1. Abnormal EKG    2. Essential hypertension    3. Elevated cholesterol    4. Shortness of breath       Bethanie is doing well.  Her blood pressure is well-controlled on the current regimen and reduced dose of chlorthalidone.  I am surprised that her creatinine has not come down  more, but she says she is making an effort to drink more water.  Hopefully her next set of blood work will look even better with her exercise and weight loss.  Her EKG is abnormal at baseline but does not show any significant change.  She has no new anginal symptoms and is doing at least 5000 steps per day.  She has no evidence of volume overload.  At her next visit, we will recheck her echocardiogram for assessment of her left ventricular function and LVH.  Bethanie had multiple questions about the meaning of certain lab values, A1c, etc., all of which were answered.    Return in about 6 months (around 9/7/2024) for follow-up, earlier if any change in symptoms.  Available medical records reviewed and updated including laboratory data, imaging studies, previous outpatient and inpatient records.  Counseling/education performed. Care everywhere utilized where appropriate.    Thank you for allowing me to participate in the care of this patient.  I hope this information is helpful.      Blanca Lake MD [facility], [facility]  3/7/2024  1:53 PM     By signing my name below, I, Grace Givens, attest that this documentation has been prepared under the direction and in the presence of MD Grace Lopez  3/7/2024 1:53 PM    This document was generated utilizing voice recognition technology. Please excuse any typographical errors which may be present.

## 2024-03-08 DIAGNOSIS — E78.00 ELEVATED CHOLESTEROL: ICD-10-CM

## 2024-03-08 RX ORDER — ROSUVASTATIN CALCIUM 5 MG/1
5 TABLET, COATED ORAL DAILY
Qty: 90 TABLET | Refills: 3 | Status: SHIPPED | OUTPATIENT
Start: 2024-03-08 | End: 2024-09-19

## 2024-09-18 NOTE — PROGRESS NOTES
"     Cardiology Office Visit     Patient ID: Bethanie Vela 78 y.o. female 1945  PCP: Edis Huang, DO   History Present Illness     Bethanie Vela returns to the office today for follow-up of her abnormal EKG, hypertension and elevated cholesterol.  She says that she is doing okay from the cardiovascular perspective, but she and her  both had COVID over the summer.  Unfortunately her  required hospitalization and she says that \"they think he might of had a mild heart attack\".  She continues to be compliant with her medications and an echocardiogram today demonstrated preserved LV function with mild LVH.  Overall there is no significant change.  We discussed all of this at the time of the visit.    On review of systems, although she is okay from the cardiovascular perspective, she says that she feels stressed and is also depressed.  She and her  are trying to sell their home and has a rather large property, so this has been difficult.  She is about to turn 79 years old which is also causing some stress for her.  Bethanie admits she is also worried about her 's health.    Allergies/Medications   Allergies:Patient has no known allergies.    Medications:    aspirin, Take 81 mg by mouth daily.    chlorthalidone, Take 0.5 tablets (12.5 mg total) by mouth daily.    nebivoloL, Take 1 tablet (5 mg total) by mouth daily.    rosuvastatin, Take 1 tablet (5 mg total) by mouth daily.     Physical Exam     Vitals:    09/19/24 1257   BP: 124/70   BP Location: Left upper arm   Patient Position: Sitting   Pulse: (!) 56   SpO2: 99%   Weight: 68.9 kg (152 lb)   Height: 1.6 m (5' 3\")     BP Readings from Last 3 Encounters:   09/19/24 124/70   09/19/24 124/70   03/07/24 128/82     Wt Readings from Last 3 Encounters:   09/19/24 68.9 kg (152 lb)   09/19/24 68.9 kg (152 lb)   03/07/24 69.9 kg (154 lb)      Physical Exam:  Constitutional: Well developed.  No apparent distress. Looks younger than " stated age.  Eyes: No icterus  ENT:  Mucosa moist  Neck: Supple, no JVD or hepatojugular reflux.  No bruits.  Cardiac: Normal S1 and S2, regular rate and rhythm, no S3.  No rub.  No ectopy.  There is a soft systolic murmur at the left sternal border.  Lungs: Clear to auscultation bilaterally.  No wheezing.  Abdomen: Soft, nontender, positive normoactive bowel sounds.  No bruit.  Extremities: No clubbing or cyanosis.  No calf tenderness.  No edema.  Distal pulses are intact.  Skin: Warm and dry.  No jaundice.  Musculoskeletal: No joint swelling or erythema.  Neurologic: Awake, alert, oriented.  Moving all extremities.  Psychiatric: No agitation.  Anxious about her health.    Labs/Imaging/Procedures   Labs  11/11/2023: Glucose 94, BUN 16, creatinine 1.48, potassium 4, LFTs unremarkable.  Total cholesterol 181, triglycerides 167, HDL 64 and LDL 89 mg/dL.     Imaging  Echo 11/17/2022:  Normal LV size and function with LVEF 70-75%.  Mild concentric LVH.  No LVOT gradient.  Abnormal septal motion, otherwise wall motion appears grossly normal.  Impaired relaxation. Normal RV size and function.  Mild TR.  Estimated RVSP 40 mmHg. The aortic valve is trileaflet and sclerotic.  No aortic stenosis.· Visualized portions aorta appear sclerotic but normal in caliber. The mitral valve leaflets are mildly thickened.  Trace to at most mild mitral regurgitation. Grossly normal pulmonic valve with trace regurgitation. The IVC is dilated with diminished respiratory collapse. Prominent pericardial fat pad, but no significant effusion. Results discussed with patient immediately following echocardiogram.  Overall no significant change.  IVC was not well visualized on the previous study.     Echo 2/3/2020: Overall normal LV size and function with LVEF 60-65%.  Mild concentric LVH. Normal wall motion.  Impaired relaxation. Top normal RV size with preserved function.  Mild TR. Estimated PASP 25 mmHg. Trileaflet aortic valve.  No aortic  stenosis.  Visualized portions of aorta appear sclerotic. Mildly thickened mitral valve leaflets with mild MR. No significant pericardial effusion.     Pharm MPI 7/11/2017: Probably normal study.  Pharmacologic EKG negative for ischemia or arrhythmia.  Small area of mildly reduced isotope uptake at the apex which is predominantly fixed.  Most likely breast attenuation.  LVEF 84% with probable LVH.     Exercise stress echo at outside institution 5/16/2017: Borderline positive EKG portion. Echocardiographic images negative for ischemia.  Below average exercise capacity.    EKG  Assessment/Plan     1. Abnormal EKG    2. Essential hypertension    3. Elevated cholesterol    4. Shortness of breath       Bethanie is doing well and her blood pressure is controlled on chlorthalidone 12.5 mg daily and nebivolol 5 mg daily.  She remains on rosuvastatin 5 mg daily, although there is no updated lipid profile available.  She believes that she will be having blood work performed shortly.  She has a history of some mild renal insufficiency and we should continue to be aggressive with her risk factor modification.  There is no evidence of congestive heart failure, no new anginal symptoms and no new wall motion abnormality on echo.  EKG is unchanged.  Her last stress test was approximately 7 years ago, but she has had no change in her symptoms, so we will continue with medical management.    Return in about 7 months (around 4/19/2025) for follow-up, earlier if any change in symptoms.  Available medical records reviewed and updated including laboratory data, imaging studies, previous outpatient and inpatient records.  Counseling/education performed. Care everywhere utilized where appropriate.    Thank you for allowing me to participate in the care of this patient.  I hope this information is helpful.      Blanca Lake MD, FACC, FASE  9/19/2024  2:35 PM  This document was generated utilizing voice recognition technology.  Please excuse any typographical errors which may be present.      By signing my name below, I, Gogo Royal, attest that this documentation has been prepared under the direction and in the presence of MD Gogo Lopez  9/19/2024 2:35 PM

## 2024-09-19 ENCOUNTER — OFFICE VISIT (OUTPATIENT)
Dept: CARDIOLOGY | Facility: CLINIC | Age: 79
End: 2024-09-19
Payer: COMMERCIAL

## 2024-09-19 ENCOUNTER — HOSPITAL ENCOUNTER (OUTPATIENT)
Dept: CARDIOLOGY | Facility: CLINIC | Age: 79
Discharge: HOME | End: 2024-09-19
Attending: INTERNAL MEDICINE
Payer: COMMERCIAL

## 2024-09-19 VITALS
WEIGHT: 152 LBS | SYSTOLIC BLOOD PRESSURE: 124 MMHG | HEIGHT: 63 IN | BODY MASS INDEX: 26.93 KG/M2 | DIASTOLIC BLOOD PRESSURE: 70 MMHG

## 2024-09-19 VITALS
DIASTOLIC BLOOD PRESSURE: 70 MMHG | OXYGEN SATURATION: 99 % | HEIGHT: 63 IN | BODY MASS INDEX: 26.93 KG/M2 | HEART RATE: 56 BPM | WEIGHT: 152 LBS | SYSTOLIC BLOOD PRESSURE: 124 MMHG

## 2024-09-19 DIAGNOSIS — R94.31 ABNORMAL EKG: ICD-10-CM

## 2024-09-19 DIAGNOSIS — I10 ESSENTIAL HYPERTENSION: ICD-10-CM

## 2024-09-19 DIAGNOSIS — R06.02 SHORTNESS OF BREATH: ICD-10-CM

## 2024-09-19 DIAGNOSIS — R94.31 ABNORMAL EKG: Primary | ICD-10-CM

## 2024-09-19 DIAGNOSIS — E78.00 ELEVATED CHOLESTEROL: ICD-10-CM

## 2024-09-19 LAB
ATRIAL RATE: 55
P AXIS: 27
PR INTERVAL: 174
QRS DURATION: 72
QT INTERVAL: 424
QTC CALCULATION(BAZETT): 405
R AXIS: 9
T WAVE AXIS: 123
VENTRICULAR RATE: 55

## 2024-09-19 PROCEDURE — 3074F SYST BP LT 130 MM HG: CPT | Performed by: INTERNAL MEDICINE

## 2024-09-19 PROCEDURE — 93000 ELECTROCARDIOGRAM COMPLETE: CPT | Performed by: INTERNAL MEDICINE

## 2024-09-19 PROCEDURE — 3008F BODY MASS INDEX DOCD: CPT | Performed by: INTERNAL MEDICINE

## 2024-09-19 PROCEDURE — 3078F DIAST BP <80 MM HG: CPT | Performed by: INTERNAL MEDICINE

## 2024-09-19 PROCEDURE — 99214 OFFICE O/P EST MOD 30 MIN: CPT | Performed by: INTERNAL MEDICINE

## 2024-09-19 PROCEDURE — 93306 TTE W/DOPPLER COMPLETE: CPT | Performed by: INTERNAL MEDICINE

## 2024-09-19 RX ORDER — ROSUVASTATIN CALCIUM 5 MG/1
5 TABLET, COATED ORAL DAILY
COMMUNITY
Start: 2024-09-19 | End: 2024-09-19 | Stop reason: SDUPTHER

## 2024-09-19 RX ORDER — NEBIVOLOL 5 MG/1
5 TABLET ORAL DAILY
Qty: 90 TABLET | Refills: 3 | Status: SHIPPED | OUTPATIENT
Start: 2024-09-19

## 2024-09-19 RX ORDER — CHLORTHALIDONE 25 MG/1
12.5 TABLET ORAL DAILY
Qty: 45 TABLET | Refills: 3 | Status: SHIPPED | OUTPATIENT
Start: 2024-09-19

## 2024-09-19 RX ORDER — ROSUVASTATIN CALCIUM 5 MG/1
5 TABLET, COATED ORAL DAILY
Qty: 90 TABLET | Refills: 3 | Status: SHIPPED | OUTPATIENT
Start: 2024-09-19

## 2024-09-19 NOTE — LETTER
"September 19, 2024     Edis Huang DO  [address]  [place] [address]    Patient: Bethanie Vela  YOB: 1945  Date of Visit: 9/19/2024      Dear Dr. Huang:    Thank you for referring Betahnie Vela to me for evaluation. Below are my notes for this consultation.    If you have questions, please do not hesitate to call me. I look forward to following your patient along with you.         Sincerely,        Blanca Lake MD        CC: No Recipients    Blnaca Silva MD  9/19/2024  2:38 PM  Sign when Signing Visit       Cardiology Office Visit     Patient ID: Bethanie Vela 78 y.o. female 1945  PCP: Edis Huang DO   History Present Illness     Bethanie Vela returns to the office today for follow-up of her abnormal EKG, hypertension and elevated cholesterol.  She says that she is doing okay from the cardiovascular perspective, but she and her  both had COVID over the summer.  Unfortunately her  required hospitalization and she says that \"they think he might of had a mild heart attack\".  She continues to be compliant with her medications and an echocardiogram today demonstrated preserved LV function with mild LVH.  Overall there is no significant change.  We discussed all of this at the time of the visit.    On review of systems, although she is okay from the cardiovascular perspective, she says that she feels stressed and is also depressed.  She and her  are trying to sell their home and has a rather large property, so this has been difficult.  She is about to turn 79 years old which is also causing some stress for her.  Bethanie admits she is also worried about her 's health.    Allergies/Medications   Allergies:Patient has no known allergies.    Medications:  •  aspirin, Take 81 mg by mouth daily.  •  chlorthalidone, Take 0.5 tablets (12.5 mg total) by mouth daily.  •  nebivoloL, Take 1 tablet (5 mg total) by mouth daily.  •  " "rosuvastatin, Take 1 tablet (5 mg total) by mouth daily.     Physical Exam     Vitals:    09/19/24 1257   BP: 124/70   BP Location: Left upper arm   Patient Position: Sitting   Pulse: (!) 56   SpO2: 99%   Weight: 68.9 kg (152 lb)   Height: 1.6 m (5' 3\")     BP Readings from Last 3 Encounters:   09/19/24 124/70   09/19/24 124/70   03/07/24 128/82     Wt Readings from Last 3 Encounters:   09/19/24 68.9 kg (152 lb)   09/19/24 68.9 kg (152 lb)   03/07/24 69.9 kg (154 lb)      Physical Exam:  Constitutional: Well developed.  No apparent distress. Looks younger than stated age.  Eyes: No icterus  ENT:  Mucosa moist  Neck: Supple, no JVD or hepatojugular reflux.  No bruits.  Cardiac: Normal S1 and S2, regular rate and rhythm, no S3.  No rub.  No ectopy.  There is a soft systolic murmur at the left sternal border.  Lungs: Clear to auscultation bilaterally.  No wheezing.  Abdomen: Soft, nontender, positive normoactive bowel sounds.  No bruit.  Extremities: No clubbing or cyanosis.  No calf tenderness.  No edema.  Distal pulses are intact.  Skin: Warm and dry.  No jaundice.  Musculoskeletal: No joint swelling or erythema.  Neurologic: Awake, alert, oriented.  Moving all extremities.  Psychiatric: No agitation.  Anxious about her health.    Labs/Imaging/Procedures   Labs  11/11/2023: Glucose 94, BUN 16, creatinine 1.48, potassium 4, LFTs unremarkable.  Total cholesterol 181, triglycerides 167, HDL 64 and LDL 89 mg/dL.     Imaging  Echo 11/17/2022:  Normal LV size and function with LVEF 70-75%.  Mild concentric LVH.  No LVOT gradient.  Abnormal septal motion, otherwise wall motion appears grossly normal.  Impaired relaxation. Normal RV size and function.  Mild TR.  Estimated RVSP 40 mmHg. The aortic valve is trileaflet and sclerotic.  No aortic stenosis.· Visualized portions aorta appear sclerotic but normal in caliber. The mitral valve leaflets are mildly thickened.  Trace to at most mild mitral regurgitation. Grossly normal " pulmonic valve with trace regurgitation. The IVC is dilated with diminished respiratory collapse. Prominent pericardial fat pad, but no significant effusion. Results discussed with patient immediately following echocardiogram.  Overall no significant change.  IVC was not well visualized on the previous study.     Echo 2/3/2020: Overall normal LV size and function with LVEF 60-65%.  Mild concentric LVH. Normal wall motion.  Impaired relaxation. Top normal RV size with preserved function.  Mild TR. Estimated PASP 25 mmHg. Trileaflet aortic valve.  No aortic stenosis.  Visualized portions of aorta appear sclerotic. Mildly thickened mitral valve leaflets with mild MR. No significant pericardial effusion.     Pharm MPI 7/11/2017: Probably normal study.  Pharmacologic EKG negative for ischemia or arrhythmia.  Small area of mildly reduced isotope uptake at the apex which is predominantly fixed.  Most likely breast attenuation.  LVEF 84% with probable LVH.     Exercise stress echo at outside institution 5/16/2017: Borderline positive EKG portion. Echocardiographic images negative for ischemia.  Below average exercise capacity.    EKG  Assessment/Plan     1. Abnormal EKG    2. Essential hypertension    3. Elevated cholesterol    4. Shortness of breath       Bethanie is doing well and her blood pressure is controlled on chlorthalidone 12.5 mg daily and nebivolol 5 mg daily.  She remains on rosuvastatin 5 mg daily, although there is no updated lipid profile available.  She believes that she will be having blood work performed shortly.  She has a history of some mild renal insufficiency and we should continue to be aggressive with her risk factor modification.  There is no evidence of congestive heart failure, no new anginal symptoms and no new wall motion abnormality on echo.  EKG is unchanged.  Her last stress test was approximately 7 years ago, but she has had no change in her symptoms, so we will continue with medical  management.    Return in about 7 months (around 4/19/2025) for follow-up, earlier if any change in symptoms.  Available medical records reviewed and updated including laboratory data, imaging studies, previous outpatient and inpatient records.  Counseling/education performed. Care everywhere utilized where appropriate.    Thank you for allowing me to participate in the care of this patient.  I hope this information is helpful.      Blanca Lake MD, [facility], [facility]  9/19/2024  2:35 PM  This document was generated utilizing voice recognition technology. Please excuse any typographical errors which may be present.      By signing my name below, I, Gogo Royal, attest that this documentation has been prepared under the direction and in the presence of MD Gogo Lopez  9/19/2024 2:35 PM

## 2024-09-20 LAB
AORTIC ROOT ANNULUS: 2.8 CM
AORTIC VALVE MEAN VELOCITY: 0.79 M/S
AORTIC VALVE VELOCITY TIME INTEGRAL: 27.5 CM
ASCENDING AORTA: 3 CM
AV MEAN GRADIENT: 3 MMHG
AV PEAK GRADIENT: 5 MMHG
AV PEAK VELOCITY-S: 1.16 M/S
AV VALVE AREA INDEX: 1.28
AV VALVE AREA: 2.02 CM2
AV VELOCITY RATIO: 0.71
AVA (VTI): 2.24 CM2
BSA FOR ECHO PROCEDURE: 1.75 M2
CUSP SEPARATION: 1.9 CM
DOP CALC LVOT STROKE VOLUME: 61.54 ML
E WAVE DECELERATION TIME: 403 MS
E/A RATIO: 0.7
E/E' RATIO: 6.9
E/LAT E' RATIO: 5.2
EDV (BP): 38.9 CM3
EF (A4C): 71.2 %
EF A2C: 75 %
EJECTION FRACTION: 71.7 %
EST RIGHT VENT SYSTOLIC PRESSURE BY TRICUSPID REGURGITATION JET: 26 MMHG
ESV (BP): 11 CM3
FRACTIONAL SHORTENING: 44.47 %
HEART RATE: 60 BPM
INTERVENTRICULAR SEPTUM: 1.17 CM
LA ESV (BP): 43.8 CM3
LA ESV INDEX (A2C): 24.86 CM3/M2
LA ESV INDEX (BP): 25.03 CM3/M2
LA/AORTA RATIO: 0.93
LAAS-AP2: 16.5 CM2
LAAS-AP4: 16.1 CM2
LAD 2D: 2.6 CM
LALD A4C: 4.92 CM
LALD A4C: 5.23 CM
LAV-S: 43.5 CM3
LEFT ATRIUM VOLUME INDEX: 26 CM3/M2
LEFT ATRIUM VOLUME: 45.5 CM3
LEFT INTERNAL DIMENSION IN SYSTOLE: 2.11 CM (ref 2.4–3.63)
LEFT VENTRICLE DIASTOLIC VOLUME INDEX: 26.4 CM3/M2
LEFT VENTRICLE DIASTOLIC VOLUME: 46.2 CM3
LEFT VENTRICLE SYSTOLIC VOLUME INDEX: 7.6 CM3/M2
LEFT VENTRICLE SYSTOLIC VOLUME: 13.3 CM3
LEFT VENTRICULAR INTERNAL DIMENSION IN DIASTOLE: 3.8 CM (ref 4.05–5.62)
LEFT VENTRICULAR POSTERIOR WALL IN END DIASTOLE: 1.16 CM (ref 0.53–0.98)
LV DIASTOLIC VOLUME: 30.5 CM3
LV ESV (APICAL 2 CHAMBER): 7.61 CM3
LVAD-AP2: 15 CM2
LVAD-AP4: 17.8 CM2
LVAS-AP2: 5.94 CM2
LVAS-AP4: 8.54 CM2
LVEDVI(A2C): 17.43 CM3/M2
LVEDVI(BP): 22.23 CM3/M2
LVESVI(A2C): 4.35 CM3/M2
LVESVI(BP): 6.29 CM3/M2
LVLD-AP2: 6.15 CM
LVLD-AP4: 5.73 CM
LVLS-AP2: 3.9 CM
LVLS-AP4: 4.65 CM
LVOT 2D: 2 CM
LVOT A: 3.14 CM2
LVOT MG: 2 MMHG
LVOT MV: 0.61 M/S
LVOT PEAK VELOCITY: 0.95 M/S
LVOT PG: 4 MMHG
LVOT STROKE VOLUME INDEX: 35.17 ML/M2
LVOT VTI: 19.6 CM
MLH CV ECHO AVA INDEX VELOCITY RATIO: 1.2
MV E'TISSUE VEL-LAT: 0.09 M/S
MV E'TISSUE VEL-MED: 0.07 M/S
MV PEAK A VEL: 0.69 M/S
MV PEAK E VEL: 0.45 M/S
MV STENOSIS PRESSURE HALF TIME: 118 MS
MV VALVE AREA P 1/2 METHOD: 1.86 CM2
POSTERIOR WALL: 1.16 CM
RAP: 3 MMHG
RVOT VMAX: 0.66 M/S
SEPTAL TISSUE DOPPLER FREE WALL LATE DIA VELOCITY (APICAL 4 CHAMBER VIEW): 0.08 M/S
TR MAX PG: 22.66 MMHG
TRICUSPID VALVE PEAK REGURGITATION VELOCITY: 2.38 M/S
Z-SCORE OF LEFT VENTRICULAR DIMENSION IN END DIASTOLE: -2.27
Z-SCORE OF LEFT VENTRICULAR DIMENSION IN END SYSTOLE: -2.66
Z-SCORE OF LEFT VENTRICULAR POSTERIOR WALL IN END DIASTOLE: 2.47

## 2024-10-30 ENCOUNTER — DOCTOR'S OFFICE (OUTPATIENT)
Facility: LOCATION | Age: 79
Setting detail: OPHTHALMOLOGY
End: 2024-10-30
Payer: MEDICARE

## 2024-10-30 DIAGNOSIS — H43.811: ICD-10-CM

## 2024-10-30 DIAGNOSIS — H16.223: ICD-10-CM

## 2024-10-30 PROCEDURE — 99213 OFFICE O/P EST LOW 20 MIN: CPT

## 2024-10-30 ASSESSMENT — VISUAL ACUITY
OS_BCVA: 20/25
OD_BCVA: 20/20